# Patient Record
Sex: FEMALE | Race: BLACK OR AFRICAN AMERICAN | Employment: UNEMPLOYED | URBAN - METROPOLITAN AREA
[De-identification: names, ages, dates, MRNs, and addresses within clinical notes are randomized per-mention and may not be internally consistent; named-entity substitution may affect disease eponyms.]

---

## 2024-10-09 PROBLEM — O09.522 MULTIGRAVIDA OF ADVANCED MATERNAL AGE IN SECOND TRIMESTER: Status: ACTIVE | Noted: 2024-10-09

## 2024-10-09 PROBLEM — O09.892 HISTORY OF PRETERM DELIVERY, CURRENTLY PREGNANT IN SECOND TRIMESTER: Status: ACTIVE | Noted: 2024-10-09

## 2024-10-09 PROBLEM — O09.219 HISTORY OF PRECIPITOUS LABOR AND DELIVERIES, ANTEPARTUM: Status: ACTIVE | Noted: 2024-10-09

## 2024-10-18 ENCOUNTER — ROUTINE PRENATAL (OUTPATIENT)
Dept: OBGYN CLINIC | Age: 39
End: 2024-10-18
Payer: MEDICAID

## 2024-10-18 ENCOUNTER — ANCILLARY PROCEDURE (OUTPATIENT)
Dept: OBGYN CLINIC | Age: 39
End: 2024-10-18
Payer: MEDICAID

## 2024-10-18 VITALS
DIASTOLIC BLOOD PRESSURE: 69 MMHG | WEIGHT: 245 LBS | HEART RATE: 98 BPM | BODY MASS INDEX: 38.37 KG/M2 | SYSTOLIC BLOOD PRESSURE: 106 MMHG

## 2024-10-18 DIAGNOSIS — R80.9 URINE PROTEIN INCREASED: ICD-10-CM

## 2024-10-18 DIAGNOSIS — O09.219 HISTORY OF PRECIPITOUS LABOR AND DELIVERIES, ANTEPARTUM: ICD-10-CM

## 2024-10-18 DIAGNOSIS — O21.9 NAUSEA AND VOMITING OF PREGNANCY, ANTEPARTUM: ICD-10-CM

## 2024-10-18 DIAGNOSIS — O09.522 MULTIGRAVIDA OF ADVANCED MATERNAL AGE IN SECOND TRIMESTER: Primary | ICD-10-CM

## 2024-10-18 DIAGNOSIS — Z87.891 RECENTLY QUIT USING TOBACCO: ICD-10-CM

## 2024-10-18 DIAGNOSIS — O09.892 HISTORY OF PRETERM DELIVERY, CURRENTLY PREGNANT IN SECOND TRIMESTER: ICD-10-CM

## 2024-10-18 DIAGNOSIS — O41.8X20 SUBCHORIONIC HEMATOMA IN SECOND TRIMESTER, SINGLE OR UNSPECIFIED FETUS: ICD-10-CM

## 2024-10-18 DIAGNOSIS — O43.192 MARGINAL INSERTION OF UMBILICAL CORD AFFECTING MANAGEMENT OF MOTHER IN SECOND TRIMESTER: ICD-10-CM

## 2024-10-18 DIAGNOSIS — O46.8X2 SUBCHORIONIC HEMATOMA IN SECOND TRIMESTER, SINGLE OR UNSPECIFIED FETUS: ICD-10-CM

## 2024-10-18 DIAGNOSIS — O44.42 LOW LYING PLACENTA NOS OR WITHOUT HEMORRHAGE, SECOND TRIMESTER: ICD-10-CM

## 2024-10-18 LAB
GLUCOSE URINE, POC: NORMAL MG/DL
PROTEIN UA: NEGATIVE

## 2024-10-18 PROCEDURE — 81002 URINALYSIS NONAUTO W/O SCOPE: CPT | Performed by: OBSTETRICS & GYNECOLOGY

## 2024-10-18 PROCEDURE — 99213 OFFICE O/P EST LOW 20 MIN: CPT | Performed by: OBSTETRICS & GYNECOLOGY

## 2024-10-18 PROCEDURE — 76817 TRANSVAGINAL US OBSTETRIC: CPT | Performed by: OBSTETRICS & GYNECOLOGY

## 2024-10-18 PROCEDURE — 76811 OB US DETAILED SNGL FETUS: CPT | Performed by: OBSTETRICS & GYNECOLOGY

## 2024-10-18 PROCEDURE — 99214 OFFICE O/P EST MOD 30 MIN: CPT | Performed by: OBSTETRICS & GYNECOLOGY

## 2024-10-18 NOTE — PROGRESS NOTES
Patient is here today for f/u. Denies any vaginal bleeding, or leakage of fluids.  Lower abdominal cramping.   Patient reports good fetal movement.

## 2024-10-18 NOTE — PROGRESS NOTES
2024      Daniel Brownlee MD  [address]  [address]  [place] [address]     RE:  SUHAS ELLISON  : 1985   AGE: 38 y.o.    This report has been created using voice recognition software. It may contain errors which are inherent in voice recognition technology.    Dear Dr. Brownlee:      I had the pleasure of meeting with Ms. ELLISON for a return consultation.  As you know, Ms. ELLISON  is a 38 y.o.  at 19w5d (WK US) who is being followed by our office for multiple medical issues.      Today, Ms. ELLISON reports that she feels well.  She notes good fetal movement and denies any symptoms of leaking of fluid, vaginal bleeding, and/or contractions.  She had a fetal ultrasound that was notable for the following.  There is a single intrauterine gestation in a breech presentation with a heart rate of 159 beats per minute.  The placenta is posterior, low-lying.  The amniotic fluid index is normal.  The composite gestational age is 19w6d.  Transvaginal cervical length 6.2 cm without funneling.      PERTINENT PHYSICAL EXAMINATION:   /69   Pulse 98   Wt 111.1 kg (245 lb)   LMP 06/15/2024 (Approximate)   BMI 38.37 kg/m²     Urine dipstick:   Negative for Glucose    Negative for Albumin      A fetal ultrasound assessment was performed today. A report is enclosed for your review.    Assessment & Plan:  38 y.o.  at 19w5d (11 WK US) with:    1.    Advanced maternal age  -- The patient was previously counseled regarding the implications of advanced maternal age in pregnancy, specifically that of aneuploidy.  Counseling was reviewed.                   .      Her options for genetic screening with cfDNA, first trimester screen, and/or quad screen were discussed.  Additionally, diagnostic testing with chronic villous sampling and/or amniocentesis was reviewed.  The risks and benefits were reviewed.  The patient indicated that she would like to have screening with NIPT.  Testing was completed

## 2024-11-04 PROBLEM — Z87.891 RECENTLY QUIT USING TOBACCO: Status: ACTIVE | Noted: 2024-11-04

## 2024-11-04 PROBLEM — O44.42 LOW LYING PLACENTA NOS OR WITHOUT HEMORRHAGE, SECOND TRIMESTER: Status: ACTIVE | Noted: 2024-11-04

## 2024-11-04 PROBLEM — O46.8X2 SUBCHORIONIC HEMATOMA IN SECOND TRIMESTER: Status: ACTIVE | Noted: 2024-11-04

## 2024-11-04 PROBLEM — R80.9 URINE PROTEIN INCREASED: Status: ACTIVE | Noted: 2024-11-04

## 2024-11-04 PROBLEM — O41.8X20 SUBCHORIONIC HEMATOMA IN SECOND TRIMESTER: Status: ACTIVE | Noted: 2024-11-04

## 2024-11-04 PROBLEM — O43.192 MARGINAL INSERTION OF UMBILICAL CORD AFFECTING MANAGEMENT OF MOTHER IN SECOND TRIMESTER: Status: ACTIVE | Noted: 2024-11-04

## 2024-11-04 PROBLEM — O21.9 NAUSEA AND VOMITING OF PREGNANCY, ANTEPARTUM: Status: ACTIVE | Noted: 2024-11-04

## 2024-11-07 ENCOUNTER — ROUTINE PRENATAL (OUTPATIENT)
Dept: OBGYN CLINIC | Age: 39
End: 2024-11-07
Payer: MEDICAID

## 2024-11-07 ENCOUNTER — ANCILLARY PROCEDURE (OUTPATIENT)
Dept: OBGYN CLINIC | Age: 39
End: 2024-11-07
Payer: MEDICAID

## 2024-11-07 VITALS
BODY MASS INDEX: 39.09 KG/M2 | SYSTOLIC BLOOD PRESSURE: 108 MMHG | WEIGHT: 249.6 LBS | DIASTOLIC BLOOD PRESSURE: 68 MMHG | HEART RATE: 98 BPM

## 2024-11-07 DIAGNOSIS — O09.219 HISTORY OF PRECIPITOUS LABOR AND DELIVERIES, ANTEPARTUM: ICD-10-CM

## 2024-11-07 DIAGNOSIS — R79.89 LOW VITAMIN D LEVEL: ICD-10-CM

## 2024-11-07 DIAGNOSIS — O09.892 HISTORY OF PRETERM DELIVERY, CURRENTLY PREGNANT IN SECOND TRIMESTER: ICD-10-CM

## 2024-11-07 DIAGNOSIS — O09.522 MULTIGRAVIDA OF ADVANCED MATERNAL AGE IN SECOND TRIMESTER: Primary | ICD-10-CM

## 2024-11-07 LAB
GLUCOSE URINE, POC: NEGATIVE MG/DL
PROTEIN UA: NEGATIVE

## 2024-11-07 PROCEDURE — 99213 OFFICE O/P EST LOW 20 MIN: CPT | Performed by: OBSTETRICS & GYNECOLOGY

## 2024-11-07 PROCEDURE — 81002 URINALYSIS NONAUTO W/O SCOPE: CPT | Performed by: OBSTETRICS & GYNECOLOGY

## 2024-11-07 PROCEDURE — 76805 OB US >/= 14 WKS SNGL FETUS: CPT | Performed by: OBSTETRICS & GYNECOLOGY

## 2024-11-07 PROCEDURE — 76817 TRANSVAGINAL US OBSTETRIC: CPT | Performed by: OBSTETRICS & GYNECOLOGY

## 2024-11-07 RX ORDER — ACETAMINOPHEN 160 MG
2000 TABLET,DISINTEGRATING ORAL DAILY
Qty: 30 CAPSULE | Refills: 3 | Status: SHIPPED | OUTPATIENT
Start: 2024-11-07

## 2024-11-07 NOTE — PATIENT INSTRUCTIONS
Please arrive for your scheduled appointment at least 15 minutes early with your actual insurance card+ a photo ID. Also if you need any refills ordered or have questions, it may take up 48 hours to reply. Please allow ample time for your refills. Call me when you use last refill. Thank you for your cooperation. You might be having an NST at your next appt. Please eat a large snack or breakfast before coming to office. Thank youCall your primary obstetrician with bleeding, leaking of fluid, abdominal tenderness, headache, blurry vision, epigastric pain and increased urinary frequency.If you are experiencing an emergency and need immediate help, call 911 or go to go emergency room or labor and delivery. Do kick counts after dinner. Call your primary obstetrician if less than 10 kicks in 2 hours after dinner.     Call your primary obstetrician with bleeding, leaking of fluid, abdominal tenderness, headache, blurry vision, epigastric pain and increased urinary frequency.if you are sick, not feeling well or have an infectious process going on please reschedule your appointment by calling [phone]. Also if any family members are not feeling well, please do not bring them to your appointment. We appreciate your cooperation. We are doing this in order to protect our pregnant mothers+ their babies.if you are sick, not feeling well or have an infectious process going on please reschedule your appointment by calling [phone]. Also if any family members are not feeling well, please do not bring them to your appointment. We appreciate your cooperation. We are doing this in order to protect our pregnant mothers+ their babies.

## 2024-11-07 NOTE — PROGRESS NOTES
Pt alert and pleasant, here for 3 wk follow up. Pt denies bleeding, cramping, and lof. Pt states positive fetal movement. Urine was negative for glucose and protein.

## 2024-11-07 NOTE — PROGRESS NOTES
2024      Daniel Brownlee MD  [address]  [address]  [place] [address]     RE:  SUHAS ELLISON  : 1985   AGE: 39 y.o.    This report has been created using voice recognition software. It may contain errors which are inherent in voice recognition technology.    Dear Dr. Brownlee:      I had the pleasure of meeting with Ms. ELLISON for a return consultation.  As you know, Ms. ELLISON  is a 39 y.o.  at 22w4d (11 WK US) who is being followed by our office for multiple medical issues.      Today, Ms. ELLISON reports that she feels well.  She notes good fetal movement and denies any symptoms of leaking of fluid, vaginal bleeding, and/or contractions.  She had a fetal ultrasound that was notable for the following.  There is a single intrauterine gestation in a breech presentation with a heart rate of 159 beats per minute.  The placenta is posterior.  The amniotic fluid index is normal.  The composite gestational age is 22w4d.  The estimated fetal weight is at the 52nd percentile.   Transvaginal cervical length 4.5 cm without funneling.      PERTINENT PHYSICAL EXAMINATION:   /68   Pulse 98   Wt 113.2 kg (249 lb 9.6 oz)   LMP 06/15/2024 (Approximate)   BMI 39.09 kg/m²     Urine dipstick:   Negative for Glucose    Negative for Albumin      A fetal ultrasound assessment was performed today. A report is enclosed for your review.    Assessment & Plan:  39 y.o.  at 22w4d (11 WK US) with:    1.   Advanced maternal age  -- The patient was previously counseled regarding the implications of advanced maternal age in pregnancy, specifically that of aneuploidy.  Counseling was reviewed.                   .      The patient completed screening with NIPT.  Testing was completed on 2024.  The fetal fraction was 11%.  Results were low risk.     The patient was also counseled regarding the recommendations for carrier screening for cystic fibrosis, spinal muscular atrophy, and Fragile X.

## 2024-11-21 ENCOUNTER — TELEPHONE (OUTPATIENT)
Dept: OBGYN CLINIC | Age: 39
End: 2024-11-21

## 2024-11-21 NOTE — TELEPHONE ENCOUNTER
Patient called in and stated that she believes she is losing her mucus plug. Patient stated she noticed a clear mucus like discharge Monday and some yesterday. She stated her cramping is normal nothing changed since beginning of pregnancy and baby is moving and denies vaginal bleeding. Patient said she left a message with her regular OB as well. I informed patient if her cramping worsens, does not feel movement, starts bleeding or discharge increases to report to L&D.

## 2024-11-24 PROBLEM — R79.89 LOW VITAMIN D LEVEL: Status: ACTIVE | Noted: 2024-11-24

## 2024-12-02 ENCOUNTER — ANCILLARY PROCEDURE (OUTPATIENT)
Dept: OBGYN CLINIC | Age: 39
End: 2024-12-02
Payer: MEDICAID

## 2024-12-02 ENCOUNTER — ROUTINE PRENATAL (OUTPATIENT)
Dept: OBGYN CLINIC | Age: 39
End: 2024-12-02
Payer: MEDICAID

## 2024-12-02 VITALS
DIASTOLIC BLOOD PRESSURE: 63 MMHG | HEART RATE: 111 BPM | WEIGHT: 255.2 LBS | SYSTOLIC BLOOD PRESSURE: 94 MMHG | BODY MASS INDEX: 39.97 KG/M2

## 2024-12-02 DIAGNOSIS — O09.892 HISTORY OF PRETERM DELIVERY, CURRENTLY PREGNANT IN SECOND TRIMESTER: ICD-10-CM

## 2024-12-02 DIAGNOSIS — R79.89 LOW VITAMIN D LEVEL: ICD-10-CM

## 2024-12-02 DIAGNOSIS — O09.219 HISTORY OF PRECIPITOUS LABOR AND DELIVERIES, ANTEPARTUM: ICD-10-CM

## 2024-12-02 DIAGNOSIS — O09.522 MULTIGRAVIDA OF ADVANCED MATERNAL AGE IN SECOND TRIMESTER: Primary | ICD-10-CM

## 2024-12-02 PROCEDURE — 99213 OFFICE O/P EST LOW 20 MIN: CPT | Performed by: OBSTETRICS & GYNECOLOGY

## 2024-12-02 PROCEDURE — 76817 TRANSVAGINAL US OBSTETRIC: CPT | Performed by: OBSTETRICS & GYNECOLOGY

## 2024-12-02 PROCEDURE — 76816 OB US FOLLOW-UP PER FETUS: CPT | Performed by: OBSTETRICS & GYNECOLOGY

## 2024-12-02 PROCEDURE — 81002 URINALYSIS NONAUTO W/O SCOPE: CPT | Performed by: OBSTETRICS & GYNECOLOGY

## 2024-12-02 NOTE — PROGRESS NOTES
Pt presents for follow up. Pt is having some discharge, thinks it may be her mucus plug coming out. Pt denies bleeding and lof. Pt is having some mild cramping. Positive fetal movement.  
gestation.  --Transvaginal cervical length 19 weeks 5 days, 6.2 cm  --Transvaginal cervical length 22 weeks 4 days, 4.5 cm  --Transvaginal cervical length 26 weeks 1 day, 4.9 cm     5.  History of renal stones -- The patient reported a history of kidney stones for which she has had surgical treatment in the past.  Secondary to this history, she is at increased risk for recurrent kidney stones.       The patient again denied having any symptoms today.  Precautions were again reviewed.     6.  Proteinuria -- The patient was noted to have trace protein on a urine dip today. She was normotensive with a blood pressure of 104/70 and she denied any signs or symptoms of preeclampsia. She denied any signs or symptoms of a urinary tract infection.  She was given orders to have a urine culture and urine P/C ratio. If any abnormalities are detected, she will be contacted with results and follow-up recommendations.  Testing ordered on 8/20/2024.     The patient's urine was negative for protein at 19 weeks 5 days.  Her blood pressure was 106/69.     The patient's urine was negative for protein at 22 weeks 4 days.  Her blood pressure was normal 108/68.    The patient's urine was negative for protein at 26 weeks 1 day.  Blood pressure was 94/63.     Preeclampsia precautions were reviewed with the patient.     7. Obesity in pregnancy -- The patient reports that she is 5 foot 7 inches tall. She  reports that her weight has been stable.  She weighed 255 pounds today.  She has gained 6 pounds since her last visit.  Her BMI was 39.97.     Counseling was previously provided to the patient.  Counseling was reviewed.     Given the increased risks previously described, additional testing is recommended.  Fetal growth should be monitored every 3-4 weeks starting at 24-26 weeks' gestation.   --Fetal growth was appropriate for the gestational age at 19 weeks 5 days  --Fetal growth was appropriate for the gestational age at 22 weeks 4

## 2024-12-02 NOTE — PATIENT INSTRUCTIONS
Please arrive for your scheduled appointment at least 15 minutes early with your actual insurance card+ a photo ID. Also if you need any refills ordered or have questions, it may take up 48 hours to reply. Please allow ample time for your refills. Call me when you use last refill. Thank you for your cooperation. You might be having an NST at your next appt. Please eat a large snack or breakfast before coming to office. Thank youCall your primary obstetrician with bleeding, leaking of fluid, abdominal tenderness, headache, blurry vision, epigastric pain and increased urinary frequency.If you are experiencing an emergency and need immediate help, call 911 or go to go emergency room or labor and delivery. Do kick counts after dinner. Call your primary obstetrician if less than 10 kicks in 2 hours after dinner.     Call your primary obstetrician with bleeding, leaking of fluid, abdominal tenderness, headache, blurry vision, epigastric pain and increased urinary frequency.if you are sick, not feeling well or have an infectious process going on please reschedule your appointment by calling [phone]. Also if any family members are not feeling well, please do not bring them to your appointment. We appreciate your cooperation. We are doing this in order to protect our pregnant mothers+ their babies.if you are sick, not feeling well or have an infectious process going on please reschedule your appointment by calling [phone]. Also if any family members are not feeling well, please do not bring them to your appointment. We appreciate your cooperation. We are doing this in order to protect our pregnant mothers+ their babies.

## 2024-12-04 LAB
GLUCOSE URINE, POC: NORMAL MG/DL
PROTEIN UA: NEGATIVE

## 2024-12-19 ENCOUNTER — ANCILLARY PROCEDURE (OUTPATIENT)
Dept: OBGYN CLINIC | Age: 39
End: 2024-12-19
Payer: MEDICAID

## 2024-12-19 ENCOUNTER — ROUTINE PRENATAL (OUTPATIENT)
Dept: OBGYN CLINIC | Age: 39
End: 2024-12-19
Payer: MEDICAID

## 2024-12-19 VITALS
DIASTOLIC BLOOD PRESSURE: 72 MMHG | OXYGEN SATURATION: 93 % | HEIGHT: 67 IN | WEIGHT: 257.3 LBS | HEART RATE: 106 BPM | BODY MASS INDEX: 40.38 KG/M2 | SYSTOLIC BLOOD PRESSURE: 106 MMHG

## 2024-12-19 DIAGNOSIS — R79.89 LOW VITAMIN D LEVEL: ICD-10-CM

## 2024-12-19 DIAGNOSIS — O09.892 HISTORY OF PRETERM DELIVERY, CURRENTLY PREGNANT IN SECOND TRIMESTER: ICD-10-CM

## 2024-12-19 DIAGNOSIS — O09.522 MULTIGRAVIDA OF ADVANCED MATERNAL AGE IN SECOND TRIMESTER: Primary | ICD-10-CM

## 2024-12-19 DIAGNOSIS — O09.219 HISTORY OF PRECIPITOUS LABOR AND DELIVERIES, ANTEPARTUM: ICD-10-CM

## 2024-12-19 LAB
GLUCOSE URINE, POC: NEGATIVE MG/DL
PROTEIN UA: POSITIVE

## 2024-12-19 PROCEDURE — 76821 MIDDLE CEREBRAL ARTERY ECHO: CPT | Performed by: OBSTETRICS & GYNECOLOGY

## 2024-12-19 PROCEDURE — 76820 UMBILICAL ARTERY ECHO: CPT | Performed by: OBSTETRICS & GYNECOLOGY

## 2024-12-19 PROCEDURE — 76817 TRANSVAGINAL US OBSTETRIC: CPT | Performed by: OBSTETRICS & GYNECOLOGY

## 2024-12-19 PROCEDURE — 99213 OFFICE O/P EST LOW 20 MIN: CPT | Performed by: OBSTETRICS & GYNECOLOGY

## 2024-12-19 PROCEDURE — 81002 URINALYSIS NONAUTO W/O SCOPE: CPT | Performed by: OBSTETRICS & GYNECOLOGY

## 2024-12-19 PROCEDURE — 76819 FETAL BIOPHYS PROFIL W/O NST: CPT | Performed by: OBSTETRICS & GYNECOLOGY

## 2024-12-19 PROCEDURE — H1000 PRENATAL CARE ATRISK ASSESSM: HCPCS | Performed by: OBSTETRICS & GYNECOLOGY

## 2024-12-19 PROCEDURE — 76815 OB US LIMITED FETUS(S): CPT | Performed by: OBSTETRICS & GYNECOLOGY

## 2024-12-19 NOTE — PATIENT INSTRUCTIONS
Please arrive for your scheduled appointment at least 15 minutes early with your actual insurance card+ a photo ID. Also if you need any refills ordered or have questions, it may take up 48 hours to reply. Please allow ample time for your refills. Call me when you use last refill. Thank you for your cooperation. You might be having an NST at your next appt. Please eat a large snack or breakfast before coming to office. Thank youCall your primary obstetrician with bleeding, leaking of fluid, abdominal tenderness, headache, blurry vision, epigastric pain and increased urinary frequency.If you are experiencing an emergency and need immediate help, call 911 or go to go emergency room or labor and delivery. Do kick counts after dinner. Call your primary obstetrician if less than 10 kicks in 2 hours after dinner.     Call your primary obstetrician with bleeding, leaking of fluid, abdominal tenderness, headache, blurry vision, epigastric pain and increased urinary frequency.if you are sick, not feeling well or have an infectious process going on please reschedule your appointment by calling [phone]. Also if any family members are not feeling well, please do not bring them to your appointment. We appreciate your cooperation. We are doing this in order to protect our pregnant mothers+ their babies.if you are sick, not feeling well or have an infectious process going on please reschedule your appointment by calling [phone]. Also if any family members are not feeling well, please do not bring them to your appointment. We appreciate your cooperation. We are doing this in order to protect our pregnant mothers+ their babies.

## 2024-12-26 NOTE — PROGRESS NOTES
PRAF completed for 3rd trimester.   
Pt presents for 6-9 week follow up. Pt denies bleeding, cramping, and lof. Pt states good fetal movement.  
recommended.  Fetal growth should be monitored every 3-4 weeks starting at 24-26 weeks' gestation.   --Fetal growth was appropriate for the gestational age at 19 weeks 5 days  --Fetal growth was appropriate for the gestational age at 22 weeks 4 days.  --Fetal growth was appropriate for the gestational age at 26 weeks 1 day     The patient should monitor fetal kick counts daily, starting at 28 weeks' gestation.  She should be scheduled for increased fetal surveillance with twice weekly fetal testing after 32 weeks' gestation.  In the absence of any complications, delivery is recommended at/by 39 weeks' gestation.     Given the increased risk for hypertensive disorders of pregnancy,  the potential utility of a low dose aspirin in reducing her risk for hypertensive disorders of pregnancy was reviewed.  The risks and benefits of low dose aspirin were discussed.  She was counseled that she could take 81 mg of aspirin, daily.  A prescription was previously provided.  The patient should continue a daily low-dose aspirin for the remainder of pregnancy and 6 to 8 weeks postpartum.     Additional maternal evaluation was recommended with a nutrition panel, thyroid function studies, and a hemoglobin A1c.  -- Baseline testing previously ordered on 8/20/2024.  Testing was completed on 10/31/2024, results included: H/H12.1/36, MCV 92.8, platelet count 240,000, magnesium 1.8, potassium 3.9, creatinine 0.6, calcium 8.7, ALT 13, AST 15, hemoglobin A1c 5%, uric acid 5.3, , ferritin 35, folate >24, vitamin B12 435, vitamin D 23, hemoglobin A1c 5%, TSH 1.2, free T4 0.8, TPO negative, antithyroglobulin antibody negative, urine protein creatinine ratio 0.099, urinalysis positive for bacteria, urine culture contaminated.  --Additional recommendations are below.        8. Family history of trisomy 21 -- The patient's family history was previously reviewed.  She reports that her maternal uncle has Down syndrome.  She denied any other

## 2025-01-08 ENCOUNTER — ANCILLARY PROCEDURE (OUTPATIENT)
Dept: OBGYN CLINIC | Age: 40
End: 2025-01-08
Payer: MEDICAID

## 2025-01-08 ENCOUNTER — ROUTINE PRENATAL (OUTPATIENT)
Dept: OBGYN CLINIC | Age: 40
End: 2025-01-08
Payer: MEDICAID

## 2025-01-08 VITALS
WEIGHT: 256 LBS | HEART RATE: 96 BPM | SYSTOLIC BLOOD PRESSURE: 106 MMHG | BODY MASS INDEX: 40.1 KG/M2 | DIASTOLIC BLOOD PRESSURE: 72 MMHG

## 2025-01-08 DIAGNOSIS — O09.892 HISTORY OF PRETERM DELIVERY, CURRENTLY PREGNANT IN SECOND TRIMESTER: Primary | ICD-10-CM

## 2025-01-08 DIAGNOSIS — O09.522 MULTIGRAVIDA OF ADVANCED MATERNAL AGE IN SECOND TRIMESTER: ICD-10-CM

## 2025-01-08 DIAGNOSIS — O09.219 HISTORY OF PRECIPITOUS LABOR AND DELIVERIES, ANTEPARTUM: ICD-10-CM

## 2025-01-08 PROCEDURE — 76818 FETAL BIOPHYS PROFILE W/NST: CPT | Performed by: OBSTETRICS & GYNECOLOGY

## 2025-01-08 PROCEDURE — 76820 UMBILICAL ARTERY ECHO: CPT | Performed by: OBSTETRICS & GYNECOLOGY

## 2025-01-08 PROCEDURE — 76821 MIDDLE CEREBRAL ARTERY ECHO: CPT | Performed by: OBSTETRICS & GYNECOLOGY

## 2025-01-08 PROCEDURE — 76817 TRANSVAGINAL US OBSTETRIC: CPT | Performed by: OBSTETRICS & GYNECOLOGY

## 2025-01-08 PROCEDURE — 76805 OB US >/= 14 WKS SNGL FETUS: CPT | Performed by: OBSTETRICS & GYNECOLOGY

## 2025-01-08 PROCEDURE — 99213 OFFICE O/P EST LOW 20 MIN: CPT | Performed by: OBSTETRICS & GYNECOLOGY

## 2025-01-08 NOTE — PROGRESS NOTES
2025      Daniel Brownlee MD  [address]  [address]  [place] [address]     RE:  USHAS ELLISON  : 1985   AGE: 39 y.o.    This report has been created using voice recognition software. It may contain errors which are inherent in voice recognition technology.    Dear Dr. Brownlee:      I had the pleasure of meeting with Ms. ELLISON for a return consultation.  As you know, Ms. ELLISON  is a 39 y.o.  at 31w3d (11 WK US) who is being followed by our office for multiple medical issues. The patient left the office following her ultrasound. The consultation was completed via telephone.     Today, Ms. ELLISON reports that she feels well.  She notes good fetal movement and denies any symptoms of leaking of fluid, vaginal bleeding, and/or contractions.  She had a fetal ultrasound that was notable for the following.  There is a single intrauterine gestation in a cephalic presentation with a heart rate of 45 beats per minute.  The placenta is posterior.  The amniotic fluid index is 12 cm.  The composite gestational age is 30w2d.  The estimated fetal weight is at the 45th percentile.  Head circumference 5th percentile.  BPP 10/10.  Doppler studies normal.  CPR PI normal.  Transvaginal cervical length 4.5 cm without funneling.    Shoulder JJ    PERTINENT PHYSICAL EXAMINATION:   /72   Pulse 96   Wt 116.1 kg (256 lb)   LMP 06/15/2024 (Approximate)   BMI 40.10 kg/m²     Urine dipstick:   No urine specimen      A fetal ultrasound assessment was performed today. A report is enclosed for your review.    Assessment & Plan:  39 y.o.  at 31w3d (11 wk US) with:    1.   Advanced maternal age  -- The patient was previously counseled regarding the implications of advanced maternal age in pregnancy, specifically that of aneuploidy.  Counseling was reviewed.                   .      The patient completed screening with NIPT.  Testing was completed on 2024.  The fetal fraction was 11%.  Results

## 2025-01-20 ENCOUNTER — ANCILLARY PROCEDURE (OUTPATIENT)
Dept: OBGYN CLINIC | Age: 40
End: 2025-01-20
Payer: MEDICAID

## 2025-01-20 ENCOUNTER — ROUTINE PRENATAL (OUTPATIENT)
Dept: OBGYN CLINIC | Age: 40
End: 2025-01-20
Payer: MEDICAID

## 2025-01-20 VITALS
HEART RATE: 114 BPM | HEIGHT: 67 IN | DIASTOLIC BLOOD PRESSURE: 73 MMHG | SYSTOLIC BLOOD PRESSURE: 117 MMHG | BODY MASS INDEX: 40.82 KG/M2 | WEIGHT: 260.1 LBS | TEMPERATURE: 97.4 F | OXYGEN SATURATION: 98 %

## 2025-01-20 DIAGNOSIS — O09.892 HISTORY OF PRETERM DELIVERY, CURRENTLY PREGNANT IN SECOND TRIMESTER: Primary | ICD-10-CM

## 2025-01-20 DIAGNOSIS — O09.522 MULTIGRAVIDA OF ADVANCED MATERNAL AGE IN SECOND TRIMESTER: ICD-10-CM

## 2025-01-20 DIAGNOSIS — O09.219 HISTORY OF PRECIPITOUS LABOR AND DELIVERIES, ANTEPARTUM: ICD-10-CM

## 2025-01-20 LAB
GLUCOSE URINE, POC: NEGATIVE MG/DL
PROTEIN UA: POSITIVE

## 2025-01-20 PROCEDURE — 81002 URINALYSIS NONAUTO W/O SCOPE: CPT | Performed by: OBSTETRICS & GYNECOLOGY

## 2025-01-20 PROCEDURE — 76821 MIDDLE CEREBRAL ARTERY ECHO: CPT | Performed by: OBSTETRICS & GYNECOLOGY

## 2025-01-20 PROCEDURE — 76818 FETAL BIOPHYS PROFILE W/NST: CPT | Performed by: OBSTETRICS & GYNECOLOGY

## 2025-01-20 PROCEDURE — 99213 OFFICE O/P EST LOW 20 MIN: CPT | Performed by: OBSTETRICS & GYNECOLOGY

## 2025-01-20 PROCEDURE — 76815 OB US LIMITED FETUS(S): CPT | Performed by: OBSTETRICS & GYNECOLOGY

## 2025-01-20 PROCEDURE — 99999 PR OFFICE/OUTPT VISIT,PROCEDURE ONLY: CPT | Performed by: OBSTETRICS & GYNECOLOGY

## 2025-01-20 PROCEDURE — 76820 UMBILICAL ARTERY ECHO: CPT | Performed by: OBSTETRICS & GYNECOLOGY

## 2025-01-20 NOTE — PATIENT INSTRUCTIONS
Please arrive for your scheduled appointment at least 15 minutes early with your actual insurance card+ a photo ID. Also if you need any refills ordered or have questions, it may take up 48 hours to reply. Please allow ample time for your refills. Call me when you use last refill. Thank you for your cooperation. You might be having an NST at your next appt. Please eat a large snack or breakfast before coming to office. Thank youCall your primary obstetrician with bleeding, leaking of fluid, abdominal tenderness, headache, blurry vision, epigastric pain and increased urinary frequency.If you are experiencing an emergency and need immediate help, call 911 or go to go emergency room or labor and delivery. Do kick counts after dinner. Call your primary obstetrician if less than 10 kicks in 2 hours after dinner.     Call your primary obstetrician with bleeding, leaking of fluid, abdominal tenderness, headache, blurry vision, epigastric pain and increased urinary frequency.if you are sick, not feeling well or have an infectious process going on please reschedule your appointment by calling [phone]. Also if any family members are not feeling well, please do not bring them to your appointment. We appreciate your cooperation. We are doing this in order to protect our pregnant mothers+ their babies.if you are sick, not feeling well or have an infectious process going on please reschedule your appointment by calling [phone]. Also if any family members are not feeling well, please do not bring them to your appointment. We appreciate your cooperation. We are doing this in order to protect our pregnant mothers+ their babies.

## 2025-01-30 ENCOUNTER — ANCILLARY PROCEDURE (OUTPATIENT)
Dept: OBGYN CLINIC | Age: 40
End: 2025-01-30
Payer: MEDICAID

## 2025-01-30 ENCOUNTER — ROUTINE PRENATAL (OUTPATIENT)
Dept: OBGYN CLINIC | Age: 40
End: 2025-01-30
Payer: MEDICAID

## 2025-01-30 VITALS
OXYGEN SATURATION: 98 % | HEART RATE: 102 BPM | TEMPERATURE: 98.9 F | SYSTOLIC BLOOD PRESSURE: 135 MMHG | DIASTOLIC BLOOD PRESSURE: 83 MMHG | WEIGHT: 259 LBS | RESPIRATION RATE: 14 BRPM | BODY MASS INDEX: 40.57 KG/M2

## 2025-01-30 DIAGNOSIS — R79.89 LOW VITAMIN D LEVEL: ICD-10-CM

## 2025-01-30 DIAGNOSIS — R80.9 URINE PROTEIN INCREASED: ICD-10-CM

## 2025-01-30 DIAGNOSIS — O43.192 MARGINAL INSERTION OF UMBILICAL CORD AFFECTING MANAGEMENT OF MOTHER IN SECOND TRIMESTER: ICD-10-CM

## 2025-01-30 DIAGNOSIS — O09.892 HISTORY OF PRETERM DELIVERY, CURRENTLY PREGNANT IN SECOND TRIMESTER: ICD-10-CM

## 2025-01-30 DIAGNOSIS — O09.219 HISTORY OF PRECIPITOUS LABOR AND DELIVERIES, ANTEPARTUM: Primary | ICD-10-CM

## 2025-01-30 LAB
GLUCOSE URINE, POC: NORMAL MG/DL
PROTEIN UA: NEGATIVE

## 2025-01-30 PROCEDURE — 76821 MIDDLE CEREBRAL ARTERY ECHO: CPT | Performed by: OBSTETRICS & GYNECOLOGY

## 2025-01-30 PROCEDURE — 76816 OB US FOLLOW-UP PER FETUS: CPT | Performed by: OBSTETRICS & GYNECOLOGY

## 2025-01-30 PROCEDURE — 99213 OFFICE O/P EST LOW 20 MIN: CPT | Performed by: OBSTETRICS & GYNECOLOGY

## 2025-01-30 PROCEDURE — 76818 FETAL BIOPHYS PROFILE W/NST: CPT | Performed by: OBSTETRICS & GYNECOLOGY

## 2025-01-30 PROCEDURE — 76820 UMBILICAL ARTERY ECHO: CPT | Performed by: OBSTETRICS & GYNECOLOGY

## 2025-01-30 PROCEDURE — 81002 URINALYSIS NONAUTO W/O SCOPE: CPT | Performed by: OBSTETRICS & GYNECOLOGY

## 2025-01-30 NOTE — PROGRESS NOTES
Patient is here today for US/nst. Denies any vaginal bleeding, cramping, or leakage of fluids.  Patient reports good fetal movement.

## 2025-01-31 NOTE — PROGRESS NOTES
2025      Daniel Brownlee MD  [address]  [address]  [place] [address]     RE:  SUHAS ELLISON  : 1985   AGE: 39 y.o.    This report has been created using voice recognition software. It may contain errors which are inherent in voice recognition technology.    Dear Dr. Brownlee:      I had the pleasure of meeting with Ms. ELLISON for a return consultation.  As you know, Ms. ELLISON  is a 39 y.o.  at 34w4d (11 WK US) who is being followed by our office for multiple medical issues. The patient left the office following her ultrasound. The consultation was completed via telephone.  Verbal consent was obtained for the patient to complete the consultation via telephone.      Today, Ms. ELLISON reports that she feels well.  She notes good fetal movement and denies any symptoms of leaking of fluid, vaginal bleeding, and/or contractions.  She had a fetal ultrasound that was notable for the following.  There is a single intrauterine gestation in a cephalic presentation with a heart rate of 157 beats per minute.  The placenta is posterior.  The amniotic fluid index is 14.6 cm.  The composite gestational age is 34w0d.  The estimated fetal weight is at the 51st percentile.   BPP 10/10.  Doppler studies normal.  CPR PI normal.      PERTINENT PHYSICAL EXAMINATION:   /83   Pulse (!) 102   Temp 98.9 °F (37.2 °C) (Temporal)   Resp 14   Wt 117.5 kg (259 lb)   LMP 06/15/2024 (Approximate)   SpO2 98%   BMI 40.57 kg/m²     Urine dipstick:   Negative for Glucose    1+ for Albumin      A fetal ultrasound assessment was performed today. A report is enclosed for your review.    Assessment & Plan:  39 y.o.  at 34w4d (11 WK US) with:    1.  Advanced maternal age  -- The patient was previously counseled regarding the implications of advanced maternal age in pregnancy, specifically that of aneuploidy.  Counseling was reviewed.                   .      The patient completed screening with NIPT.

## 2025-02-03 ENCOUNTER — ANCILLARY PROCEDURE (OUTPATIENT)
Dept: OBGYN CLINIC | Age: 40
End: 2025-02-03
Payer: MEDICAID

## 2025-02-03 ENCOUNTER — ROUTINE PRENATAL (OUTPATIENT)
Dept: OBGYN CLINIC | Age: 40
End: 2025-02-03
Payer: MEDICAID

## 2025-02-03 VITALS
RESPIRATION RATE: 12 BRPM | TEMPERATURE: 97.2 F | SYSTOLIC BLOOD PRESSURE: 110 MMHG | HEART RATE: 120 BPM | DIASTOLIC BLOOD PRESSURE: 60 MMHG | OXYGEN SATURATION: 98 % | BODY MASS INDEX: 40.38 KG/M2 | WEIGHT: 257.8 LBS

## 2025-02-03 DIAGNOSIS — O44.42 LOW LYING PLACENTA NOS OR WITHOUT HEMORRHAGE, SECOND TRIMESTER: ICD-10-CM

## 2025-02-03 DIAGNOSIS — O36.8130 DECREASED FETAL MOVEMENTS IN THIRD TRIMESTER, SINGLE OR UNSPECIFIED FETUS: ICD-10-CM

## 2025-02-03 DIAGNOSIS — O09.892 HISTORY OF PRETERM DELIVERY, CURRENTLY PREGNANT IN SECOND TRIMESTER: ICD-10-CM

## 2025-02-03 DIAGNOSIS — O09.522 MULTIGRAVIDA OF ADVANCED MATERNAL AGE IN SECOND TRIMESTER: ICD-10-CM

## 2025-02-03 DIAGNOSIS — O43.192 MARGINAL INSERTION OF UMBILICAL CORD AFFECTING MANAGEMENT OF MOTHER IN SECOND TRIMESTER: ICD-10-CM

## 2025-02-03 DIAGNOSIS — O09.219 HISTORY OF PRECIPITOUS LABOR AND DELIVERIES, ANTEPARTUM: Primary | ICD-10-CM

## 2025-02-03 LAB
GLUCOSE URINE, POC: NEGATIVE MG/DL
PROTEIN UA: POSITIVE

## 2025-02-03 PROCEDURE — 76815 OB US LIMITED FETUS(S): CPT | Performed by: OBSTETRICS & GYNECOLOGY

## 2025-02-03 PROCEDURE — 76817 TRANSVAGINAL US OBSTETRIC: CPT | Performed by: OBSTETRICS & GYNECOLOGY

## 2025-02-03 PROCEDURE — 81002 URINALYSIS NONAUTO W/O SCOPE: CPT | Performed by: OBSTETRICS & GYNECOLOGY

## 2025-02-03 PROCEDURE — 76821 MIDDLE CEREBRAL ARTERY ECHO: CPT | Performed by: OBSTETRICS & GYNECOLOGY

## 2025-02-03 PROCEDURE — 99213 OFFICE O/P EST LOW 20 MIN: CPT | Performed by: OBSTETRICS & GYNECOLOGY

## 2025-02-03 PROCEDURE — 76820 UMBILICAL ARTERY ECHO: CPT | Performed by: OBSTETRICS & GYNECOLOGY

## 2025-02-03 PROCEDURE — 59025 FETAL NON-STRESS TEST: CPT | Performed by: OBSTETRICS & GYNECOLOGY

## 2025-02-03 PROCEDURE — 76818 FETAL BIOPHYS PROFILE W/NST: CPT | Performed by: OBSTETRICS & GYNECOLOGY

## 2025-02-03 NOTE — PROGRESS NOTES
Raghu ELLISON presents to office today for NST.  Patient denies bleeding, LOF, or contractions. C/O pelvic pressure  Positive fetal movement, but feels that is decreased.

## 2025-02-03 NOTE — PROGRESS NOTES
Mari in Antepartum notified of patient coming for prolonged monitoring d/t c/o decreased fetal movement.

## 2025-02-04 NOTE — PROGRESS NOTES
February 3, 2025      Daniel Brownlee MD  [address]  [address]  [place] [address]     RE:  SUHAS ELLISON  : 1985   AGE: 39 y.o.    This report has been created using voice recognition software. It may contain errors which are inherent in voice recognition technology.    Dear Dr. Brownlee:      As you know, Ms. ELLISON  is a 39 y.o.  at 35w1d (11 WK US ) who is being followed by our office for advanced maternal age, a history of 3 prior precipitous deliveries/home deliveries, BMI 40, a marginal umbilical cord insertion, low vitamin D, and a lagging head circumference.    The patient presented to the office today for fetal testing.  She reported having decreased fetal movement per nursing.      The patient had a fetal ultrasound that was notable for the following.  There is a single intrauterine gestation in a cephalic presentation with a heart rate of 150 beats per minute.  The placenta is posterior.  The amniotic fluid index is 16 cm.  BPP 10/10.  Umbilical artery PI normal.  MCA Dopplers normal.  MCA PSV 1.11 MOM.  CPR PI normal.  Transvaginal cervical length 3.5 cm without funneling.      PERTINENT PHYSICAL EXAMINATION:   /60   Pulse (!) 120   Temp 97.2 °F (36.2 °C)   Resp 12   Wt 116.9 kg (257 lb 12.8 oz)   LMP 06/15/2024 (Approximate)   SpO2 98%   BMI 40.38 kg/m²     Urine dipstick:   Negative for Glucose    Trace for Albumin      A fetal ultrasound assessment was performed today. A report is enclosed for your review.    Assessment & Plan:  39 y.o.  at 35w1d (11 WK US) with:    1.  Fetal testing -- The patient presented to the office today for fetal testing secondary to advanced maternal age, a history of 3 prior precipitous deliveries/home deliveries, BMI 40, a marginal umbilical cord insertion, low vitamin D, and a lagging head circumference.      The patient presented to the office for fetal testing.  She reported decreased fetal movement.  Fetal testing was reassuring

## 2025-02-06 ENCOUNTER — ROUTINE PRENATAL (OUTPATIENT)
Dept: OBGYN CLINIC | Age: 40
End: 2025-02-06
Payer: MEDICAID

## 2025-02-06 ENCOUNTER — ANCILLARY PROCEDURE (OUTPATIENT)
Dept: OBGYN CLINIC | Age: 40
End: 2025-02-06
Payer: MEDICAID

## 2025-02-06 VITALS
DIASTOLIC BLOOD PRESSURE: 61 MMHG | HEIGHT: 67 IN | SYSTOLIC BLOOD PRESSURE: 101 MMHG | TEMPERATURE: 97.7 F | BODY MASS INDEX: 41.64 KG/M2 | WEIGHT: 265.3 LBS | HEART RATE: 93 BPM | OXYGEN SATURATION: 98 %

## 2025-02-06 DIAGNOSIS — O09.219 HISTORY OF PRECIPITOUS LABOR AND DELIVERIES, ANTEPARTUM: Primary | ICD-10-CM

## 2025-02-06 DIAGNOSIS — O09.522 MULTIGRAVIDA OF ADVANCED MATERNAL AGE IN SECOND TRIMESTER: ICD-10-CM

## 2025-02-06 DIAGNOSIS — O44.42 LOW LYING PLACENTA NOS OR WITHOUT HEMORRHAGE, SECOND TRIMESTER: ICD-10-CM

## 2025-02-06 DIAGNOSIS — O43.192 MARGINAL INSERTION OF UMBILICAL CORD AFFECTING MANAGEMENT OF MOTHER IN SECOND TRIMESTER: ICD-10-CM

## 2025-02-06 LAB
GLUCOSE URINE, POC: NEGATIVE MG/DL
PROTEIN UA: POSITIVE

## 2025-02-06 PROCEDURE — 81002 URINALYSIS NONAUTO W/O SCOPE: CPT | Performed by: OBSTETRICS & GYNECOLOGY

## 2025-02-06 PROCEDURE — 99213 OFFICE O/P EST LOW 20 MIN: CPT | Performed by: OBSTETRICS & GYNECOLOGY

## 2025-02-06 PROCEDURE — 99999 PR OFFICE/OUTPT VISIT,PROCEDURE ONLY: CPT | Performed by: OBSTETRICS & GYNECOLOGY

## 2025-02-06 PROCEDURE — 76821 MIDDLE CEREBRAL ARTERY ECHO: CPT | Performed by: OBSTETRICS & GYNECOLOGY

## 2025-02-06 PROCEDURE — 76815 OB US LIMITED FETUS(S): CPT | Performed by: OBSTETRICS & GYNECOLOGY

## 2025-02-06 PROCEDURE — 76820 UMBILICAL ARTERY ECHO: CPT | Performed by: OBSTETRICS & GYNECOLOGY

## 2025-02-06 PROCEDURE — 76818 FETAL BIOPHYS PROFILE W/NST: CPT | Performed by: OBSTETRICS & GYNECOLOGY

## 2025-02-06 NOTE — PATIENT INSTRUCTIONS
Please arrive for your scheduled appointment at least 15 minutes early with your actual insurance card+ a photo ID. Also if you need any refills ordered or have questions, it may take up 48 hours to reply. Please allow ample time for your refills. Call me when you use last refill. Thank you for your cooperation. You might be having an NST at your next appt. Please eat a large snack or breakfast before coming to office. Thank youCall your primary obstetrician with bleeding, leaking of fluid, abdominal tenderness, headache, blurry vision, epigastric pain and increased urinary frequency.If you are experiencing an emergency and need immediate help, call 911 or go to go emergency room or labor and delivery. Do kick counts after dinner. Call your primary obstetrician if less than 10 kicks in 2 hours after dinner.     Call your primary obstetrician with bleeding, leaking of fluid, abdominal tenderness, headache, blurry vision, epigastric pain and increased urinary frequency.if you are sick, not feeling well or have an infectious process going on please reschedule your appointment by calling [phone]. Also if any family members are not feeling well, please do not bring them to your appointment. We appreciate your cooperation. We are doing this in order to protect our pregnant mothers+ their babies.if you are sick, not feeling well or have an infectious process going on please reschedule your appointment by calling [phone]. Also if any family members are not feeling well, please do not bring them to your appointment. We appreciate your cooperation. We are doing this in order to protect our pregnant mothers+ their babies.

## 2025-02-06 NOTE — PROGRESS NOTES
Raghu ELLISON here for follow up ultrasound and nst.  She denies bleeding and cramping. Pt had some LOF last night. She states she hasn't had any since.  Positive fetal movement.

## 2025-02-12 NOTE — PROGRESS NOTES
2025      Daniel Brownlee MD  [address]  [address]  [place] [address]     RE:  SUHAS ELLISON  : 1985   AGE: 39 y.o.    This report has been created using voice recognition software. It may contain errors which are inherent in voice recognition technology.    Dear Dr. Brownlee:      As you know, Ms. ELLISON  is a 39 y.o.  at 33w1d (11 WK US) who is being followed by our office for multiple medical issues.      The patient had a fetal ultrasound that was notable for the following.  There is a single intrauterine gestation in a cephalic presentation with a heart rate of 170 beats per minute.  The placenta is posterior.  The amniotic fluid index is 11.8 cm.  BPP 10/10.  Doppler studies normal.  CPR PI normal.      PERTINENT PHYSICAL EXAMINATION:   /73 (Position: Sitting)   Pulse (!) 114   Temp 97.4 °F (36.3 °C)   Ht 1.702 m (5' 7\")   Wt 118 kg (260 lb 1.6 oz)   LMP 06/15/2024 (Approximate)   SpO2 98%   BMI 40.74 kg/m²     Urine dipstick:   Negative for Glucose    Trace for Albumin      A fetal ultrasound assessment was performed today. A report is enclosed for your review.    Assessment & Plan:  39 y.o.  at 33w1d (11 WK US) with:    1.  Fetal testing -- The patient presented to the office today for fetal testing secondary to advanced maternal age, a history of 3 prior precipitous deliveries/home deliveries, proteinuria, BMI 41, subchronic hemorrhage, a marginal cord insertion low vitamin D, and a lagging head circumference.  The patient did not have any concerns today per nursing.  Fetal testing was reassuring and the biophysical profile was 10/10 and Doppler studies were normal.    The patient should continue to have twice-weekly fetal testing until delivery.    Her cervical length should be monitored in the third trimester secondary to her history of precipitous deliveries.    Delivery is recommended at/by 39 weeks gestation.  Delivery may be indicated sooner should

## 2025-02-13 NOTE — PROGRESS NOTES
2025      Daniel Brownlee MD  [address]  [address]  [place] [address]     RE:  SUHAS ELLISON  : 1985   AGE: 39 y.o.    This report has been created using voice recognition software. It may contain errors which are inherent in voice recognition technology.    Dear Dr. Brownlee:      As you know, Ms. ELLISON  is a 39 y.o.  at 35w4d (11 WK US) who is being followed by our office for a history of 3 prior precipitous deliveries/home deliveries, BMI 41, a marginal umbilical cord insertion, and advanced maternal age.      The patient had a fetal ultrasound that was notable for the following.  There is a single intrauterine gestation in a cephalic presentation with a heart rate of 157 beats per minute.  The placenta is posterior.  The amniotic fluid index is 12 cm.  BPP 10/10.  Doppler studies normal.  CPR PI >1.        PERTINENT PHYSICAL EXAMINATION:   /61 (Position: Sitting)   Pulse 93   Temp 97.7 °F (36.5 °C)   Ht 1.702 m (5' 7\")   Wt 120.3 kg (265 lb 4.8 oz)   LMP 06/15/2024 (Approximate)   SpO2 98%   BMI 41.55 kg/m²     Urine dipstick:   Negative for Glucose    Trace for Albumin      A fetal ultrasound assessment was performed today. A report is enclosed for your review.    Assessment & Plan:  39 y.o.  at 35w4d (11 WK US) with:    1.  Fetal testing -- The patient presented to the office today for fetal testing secondary to the issues outlined above.  The patient did not have any concerns today per nursing.  Fetal testing was reassuring with a biophysical profile score of 10/10 and normal Doppler studies.    The patient to continue to have twice-weekly testing until delivery.        --I requested the patient return for a follow-up assessment in 4 days unless there is a clinical reason for her to return prior to that time. She is to call if she has any problems or questions prior to her next visit. Further evaluation and management will be dependent on her clinical

## 2025-02-20 ENCOUNTER — ROUTINE PRENATAL (OUTPATIENT)
Dept: OBGYN CLINIC | Age: 40
End: 2025-02-20
Payer: MEDICAID

## 2025-02-20 ENCOUNTER — ANCILLARY PROCEDURE (OUTPATIENT)
Dept: OBGYN CLINIC | Age: 40
End: 2025-02-20
Payer: MEDICAID

## 2025-02-20 VITALS
DIASTOLIC BLOOD PRESSURE: 79 MMHG | SYSTOLIC BLOOD PRESSURE: 119 MMHG | HEART RATE: 114 BPM | BODY MASS INDEX: 40.23 KG/M2 | TEMPERATURE: 97.6 F | WEIGHT: 256.3 LBS | HEIGHT: 67 IN

## 2025-02-20 DIAGNOSIS — O44.42 LOW LYING PLACENTA NOS OR WITHOUT HEMORRHAGE, SECOND TRIMESTER: ICD-10-CM

## 2025-02-20 DIAGNOSIS — O09.522 MULTIGRAVIDA OF ADVANCED MATERNAL AGE IN SECOND TRIMESTER: ICD-10-CM

## 2025-02-20 DIAGNOSIS — O09.219 HISTORY OF PRECIPITOUS LABOR AND DELIVERIES, ANTEPARTUM: Primary | ICD-10-CM

## 2025-02-20 LAB
GLUCOSE URINE, POC: NEGATIVE MG/DL
PROTEIN UA: POSITIVE

## 2025-02-20 PROCEDURE — 81002 URINALYSIS NONAUTO W/O SCOPE: CPT | Performed by: OBSTETRICS & GYNECOLOGY

## 2025-02-20 PROCEDURE — 76821 MIDDLE CEREBRAL ARTERY ECHO: CPT | Performed by: OBSTETRICS & GYNECOLOGY

## 2025-02-20 PROCEDURE — 76818 FETAL BIOPHYS PROFILE W/NST: CPT | Performed by: OBSTETRICS & GYNECOLOGY

## 2025-02-20 PROCEDURE — 76820 UMBILICAL ARTERY ECHO: CPT | Performed by: OBSTETRICS & GYNECOLOGY

## 2025-02-20 PROCEDURE — 99203 OFFICE O/P NEW LOW 30 MIN: CPT | Performed by: OBSTETRICS & GYNECOLOGY

## 2025-02-20 PROCEDURE — 76816 OB US FOLLOW-UP PER FETUS: CPT | Performed by: OBSTETRICS & GYNECOLOGY

## 2025-02-20 PROCEDURE — 99999 PR OFFICE/OUTPT VISIT,PROCEDURE ONLY: CPT | Performed by: OBSTETRICS & GYNECOLOGY

## 2025-02-20 NOTE — PROGRESS NOTES
Raghu ELLISON here for follow up ultrasound today.   She denies bleeding and lof. Pt is having cramping. Pt would like refill on aspirin sent to pharmacy.  Positive fetal movement.

## 2025-02-20 NOTE — PATIENT INSTRUCTIONS
Please arrive for your scheduled appointment at least 15 minutes early with your actual insurance card+ a photo ID. Also if you need any refills ordered or have questions, it may take up 48 hours to reply. Please allow ample time for your refills. Call me when you use last refill. Thank you for your cooperation. You might be having an NST at your next appt. Please eat a large snack or breakfast before coming to office. Thank youCall your primary obstetrician with bleeding, leaking of fluid, abdominal tenderness, headache, blurry vision, epigastric pain and increased urinary frequency.If you are experiencing an emergency and need immediate help, call 911 or go to go emergency room or labor and delivery. Do kick counts after dinner. Call your primary obstetrician if less than 10 kicks in 2 hours after dinner.     Call your primary obstetrician with bleeding, leaking of fluid, abdominal tenderness, headache, blurry vision, epigastric pain and increased urinary frequency.if you are sick, not feeling well or have an infectious process going on please reschedule your appointment by calling [phone]. Also if any family members are not feeling well, please do not bring them to your appointment. We appreciate your cooperation. We are doing this in order to protect our pregnant mothers+ their babies.if you are sick, not feeling well or have an infectious process going on please reschedule your appointment by calling [phone]. Also if any family members are not feeling well, please do not bring them to your appointment. We appreciate your cooperation. We are doing this in order to protect our pregnant mothers+ their babies.

## 2025-02-21 NOTE — PROGRESS NOTES
2025      Daniel Brownlee MD  [address]  [address]  [place] [address]     RE:  SUHAS ELLISON  : 1985   AGE: 39 y.o.    This report has been created using voice recognition software. It may contain errors which are inherent in voice recognition technology.    Dear Dr. Brownlee:      As you know, Ms. ELLISON  is a 39 y.o.  at 37w4d (11 WK US) who is being followed by our office for multiple medical issues including advanced maternal age, a history of 3 prior precipitous vaginal deliveries, BMI 40, a marginal umbilical cord insertion, low vitamin D, and a lagging fetal head circumference that has improved.      The patient had a fetal ultrasound that was notable for the following.  There is a single intrauterine gestation in a cephalic presentation with a heart rate of 154 beats per minute.  The placenta is posterior.  The amniotic fluid index is 14.6 cm.  The composite gestational age is 36w1d.  The estimated fetal weight is at the 39th percentile.  HC 24th percentile, AC 13 percentile.  BPP 10/10.  Umbilical artery PI normal.  MCA Dopplers normal.  CPR PI normal.      PERTINENT PHYSICAL EXAMINATION:   /79 (Position: Sitting)   Pulse (!) 114   Temp 97.6 °F (36.4 °C)   Ht 1.702 m (5' 7\")   Wt 116.3 kg (256 lb 4.8 oz)   LMP 06/15/2024 (Approximate)   BMI 40.14 kg/m²     Urine dipstick:   Negative for Glucose    Trace for Albumin      A fetal ultrasound assessment was performed today. A report is enclosed for your review.    Assessment & Plan:  39 y.o.  at 37w4d (11 WK US) with:    1.  Fetal testing -- The patient presented to the office today for fetal testing secondary to the issues outlined above.  The patient did not have any concerns today per nursing.  Fetal growth was appropriate for the gestational age.  Fetal testing was reassuring.    The patient should continue to have twice-weekly fetal testing until delivery.    Delivery is recommended at/by 39 weeks

## 2025-03-06 ENCOUNTER — ANESTHESIA (OUTPATIENT)
Dept: LABOR AND DELIVERY | Age: 40
End: 2025-03-06
Payer: MEDICAID

## 2025-03-06 ENCOUNTER — HOSPITAL ENCOUNTER (INPATIENT)
Age: 40
LOS: 2 days | Discharge: HOME | End: 2025-03-08
Attending: FAMILY MEDICINE | Admitting: FAMILY MEDICINE
Payer: MEDICAID

## 2025-03-06 ENCOUNTER — ANESTHESIA EVENT (OUTPATIENT)
Dept: LABOR AND DELIVERY | Age: 40
End: 2025-03-06
Payer: MEDICAID

## 2025-03-06 PROBLEM — Z3A.38 38 WEEKS GESTATION OF PREGNANCY: Status: ACTIVE | Noted: 2025-03-06

## 2025-03-06 LAB
ABO + RH BLD: NORMAL
AMPHET UR QL SCN: NEGATIVE
ARM BAND NUMBER: NORMAL
BARBITURATES UR QL SCN: NEGATIVE
BENZODIAZ UR QL: NEGATIVE
BLOOD BANK SAMPLE EXPIRATION: NORMAL
BLOOD GROUP ANTIBODIES SERPL: NEGATIVE
BUPRENORPHINE UR QL: NEGATIVE
CANNABINOIDS UR QL SCN: NEGATIVE
COCAINE UR QL SCN: NEGATIVE
ERYTHROCYTE [DISTWIDTH] IN BLOOD BY AUTOMATED COUNT: 13.2 % (ref 11.5–15)
FENTANYL UR QL: NEGATIVE
HCT VFR BLD AUTO: 45.3 % (ref 34–48)
HGB BLD-MCNC: 15.2 G/DL (ref 11.5–15.5)
MCH RBC QN AUTO: 31.5 PG (ref 26–35)
MCHC RBC AUTO-ENTMCNC: 33.6 G/DL (ref 32–34.5)
MCV RBC AUTO: 93.8 FL (ref 80–99.9)
METHADONE UR QL: NEGATIVE
OPIATES UR QL SCN: NEGATIVE
OXYCODONE UR QL SCN: NEGATIVE
PCP UR QL SCN: NEGATIVE
PLATELET # BLD AUTO: 232 K/UL (ref 130–450)
PMV BLD AUTO: 10.6 FL (ref 7–12)
RBC # BLD AUTO: 4.83 M/UL (ref 3.5–5.5)
TEST INFORMATION: NORMAL
WBC OTHER # BLD: 6.7 K/UL (ref 4.5–11.5)

## 2025-03-06 PROCEDURE — 51701 INSERT BLADDER CATHETER: CPT

## 2025-03-06 PROCEDURE — 86850 RBC ANTIBODY SCREEN: CPT

## 2025-03-06 PROCEDURE — 10H07YZ INSERTION OF OTHER DEVICE INTO PRODUCTS OF CONCEPTION, VIA NATURAL OR ARTIFICIAL OPENING: ICD-10-PCS | Performed by: MIDWIFE

## 2025-03-06 PROCEDURE — 10907ZC DRAINAGE OF AMNIOTIC FLUID, THERAPEUTIC FROM PRODUCTS OF CONCEPTION, VIA NATURAL OR ARTIFICIAL OPENING: ICD-10-PCS | Performed by: MIDWIFE

## 2025-03-06 PROCEDURE — 1220000001 HC SEMI PRIVATE L&D R&B

## 2025-03-06 PROCEDURE — 86900 BLOOD TYPING SEROLOGIC ABO: CPT

## 2025-03-06 PROCEDURE — 86901 BLOOD TYPING SEROLOGIC RH(D): CPT

## 2025-03-06 PROCEDURE — 85027 COMPLETE CBC AUTOMATED: CPT

## 2025-03-06 PROCEDURE — 10H073Z INSERTION OF MONITORING ELECTRODE INTO PRODUCTS OF CONCEPTION, VIA NATURAL OR ARTIFICIAL OPENING: ICD-10-PCS | Performed by: MIDWIFE

## 2025-03-06 PROCEDURE — 80307 DRUG TEST PRSMV CHEM ANLYZR: CPT

## 2025-03-06 PROCEDURE — 7200000001 HC VAGINAL DELIVERY

## 2025-03-06 PROCEDURE — 2500000003 HC RX 250 WO HCPCS

## 2025-03-06 PROCEDURE — 6370000000 HC RX 637 (ALT 250 FOR IP)

## 2025-03-06 PROCEDURE — 3700000025 EPIDURAL BLOCK: Performed by: STUDENT IN AN ORGANIZED HEALTH CARE EDUCATION/TRAINING PROGRAM

## 2025-03-06 RX ORDER — EPHEDRINE SULFATE/0.9% NACL/PF 25 MG/5 ML
10 SYRINGE (ML) INTRAVENOUS
Status: DISPENSED | OUTPATIENT
Start: 2025-03-06 | End: 2025-03-07

## 2025-03-06 RX ORDER — SODIUM CHLORIDE 0.9 % (FLUSH) 0.9 %
5-40 SYRINGE (ML) INJECTION EVERY 12 HOURS SCHEDULED
Status: DISCONTINUED | OUTPATIENT
Start: 2025-03-07 | End: 2025-03-08 | Stop reason: HOSPADM

## 2025-03-06 RX ORDER — PENICILLIN G POTASSIUM 5000000 [IU]/1
INJECTION, POWDER, FOR SOLUTION INTRAMUSCULAR; INTRAVENOUS
Status: DISCONTINUED
Start: 2025-03-06 | End: 2025-03-06 | Stop reason: WASHOUT

## 2025-03-06 RX ORDER — ONDANSETRON 2 MG/ML
4 INJECTION INTRAMUSCULAR; INTRAVENOUS EVERY 6 HOURS PRN
Status: DISCONTINUED | OUTPATIENT
Start: 2025-03-06 | End: 2025-03-08 | Stop reason: HOSPADM

## 2025-03-06 RX ORDER — MISOPROSTOL 200 UG/1
400 TABLET ORAL PRN
Status: DISCONTINUED | OUTPATIENT
Start: 2025-03-06 | End: 2025-03-08 | Stop reason: HOSPADM

## 2025-03-06 RX ORDER — SODIUM CHLORIDE, SODIUM LACTATE, POTASSIUM CHLORIDE, AND CALCIUM CHLORIDE .6; .31; .03; .02 G/100ML; G/100ML; G/100ML; G/100ML
500 INJECTION, SOLUTION INTRAVENOUS PRN
Status: DISCONTINUED | OUTPATIENT
Start: 2025-03-06 | End: 2025-03-08 | Stop reason: HOSPADM

## 2025-03-06 RX ORDER — NALOXONE HYDROCHLORIDE 0.4 MG/ML
INJECTION, SOLUTION INTRAMUSCULAR; INTRAVENOUS; SUBCUTANEOUS PRN
Status: DISCONTINUED | OUTPATIENT
Start: 2025-03-06 | End: 2025-03-08 | Stop reason: HOSPADM

## 2025-03-06 RX ORDER — ONDANSETRON 4 MG/1
4 TABLET, ORALLY DISINTEGRATING ORAL EVERY 6 HOURS PRN
Status: DISCONTINUED | OUTPATIENT
Start: 2025-03-06 | End: 2025-03-08 | Stop reason: HOSPADM

## 2025-03-06 RX ORDER — SODIUM CHLORIDE 0.9 % (FLUSH) 0.9 %
5-40 SYRINGE (ML) INJECTION PRN
Status: DISCONTINUED | OUTPATIENT
Start: 2025-03-06 | End: 2025-03-06 | Stop reason: SDUPTHER

## 2025-03-06 RX ORDER — FERROUS SULFATE 325(65) MG
325 TABLET ORAL EVERY OTHER DAY
Status: DISCONTINUED | OUTPATIENT
Start: 2025-03-07 | End: 2025-03-08 | Stop reason: HOSPADM

## 2025-03-06 RX ORDER — ACETAMINOPHEN 500 MG
1000 TABLET ORAL EVERY 8 HOURS PRN
Status: DISCONTINUED | OUTPATIENT
Start: 2025-03-06 | End: 2025-03-08 | Stop reason: HOSPADM

## 2025-03-06 RX ORDER — SODIUM CHLORIDE 9 MG/ML
25 INJECTION, SOLUTION INTRAVENOUS PRN
Status: DISCONTINUED | OUTPATIENT
Start: 2025-03-06 | End: 2025-03-06 | Stop reason: SDUPTHER

## 2025-03-06 RX ORDER — DOCUSATE SODIUM 100 MG/1
100 CAPSULE, LIQUID FILLED ORAL 2 TIMES DAILY
Status: DISCONTINUED | OUTPATIENT
Start: 2025-03-06 | End: 2025-03-06 | Stop reason: SDUPTHER

## 2025-03-06 RX ORDER — ACETAMINOPHEN 650 MG
TABLET, EXTENDED RELEASE ORAL
Status: DISPENSED
Start: 2025-03-06 | End: 2025-03-07

## 2025-03-06 RX ORDER — LIDOCAINE HYDROCHLORIDE 10 MG/ML
INJECTION, SOLUTION INFILTRATION; PERINEURAL
Status: DISPENSED
Start: 2025-03-06 | End: 2025-03-07

## 2025-03-06 RX ORDER — DEXTROSE MONOHYDRATE 50 MG/ML
INJECTION, SOLUTION INTRAVENOUS
Status: DISCONTINUED
Start: 2025-03-06 | End: 2025-03-06 | Stop reason: WASHOUT

## 2025-03-06 RX ORDER — KETOROLAC TROMETHAMINE 30 MG/ML
30 INJECTION, SOLUTION INTRAMUSCULAR; INTRAVENOUS EVERY 6 HOURS PRN
Status: DISCONTINUED | OUTPATIENT
Start: 2025-03-06 | End: 2025-03-08 | Stop reason: HOSPADM

## 2025-03-06 RX ORDER — ONDANSETRON 4 MG/1
4 TABLET, ORALLY DISINTEGRATING ORAL EVERY 6 HOURS PRN
Status: DISCONTINUED | OUTPATIENT
Start: 2025-03-06 | End: 2025-03-06

## 2025-03-06 RX ORDER — SODIUM CHLORIDE, SODIUM LACTATE, POTASSIUM CHLORIDE, CALCIUM CHLORIDE 600; 310; 30; 20 MG/100ML; MG/100ML; MG/100ML; MG/100ML
INJECTION, SOLUTION INTRAVENOUS CONTINUOUS
Status: DISCONTINUED | OUTPATIENT
Start: 2025-03-07 | End: 2025-03-08 | Stop reason: HOSPADM

## 2025-03-06 RX ORDER — SODIUM CHLORIDE 0.9 % (FLUSH) 0.9 %
5-40 SYRINGE (ML) INJECTION EVERY 12 HOURS SCHEDULED
Status: DISCONTINUED | OUTPATIENT
Start: 2025-03-06 | End: 2025-03-06 | Stop reason: SDUPTHER

## 2025-03-06 RX ORDER — DOCUSATE SODIUM 100 MG/1
100 CAPSULE, LIQUID FILLED ORAL 2 TIMES DAILY
Status: DISCONTINUED | OUTPATIENT
Start: 2025-03-07 | End: 2025-03-08 | Stop reason: HOSPADM

## 2025-03-06 RX ORDER — IBUPROFEN 800 MG/1
800 TABLET, FILM COATED ORAL EVERY 8 HOURS PRN
Status: DISCONTINUED | OUTPATIENT
Start: 2025-03-06 | End: 2025-03-08 | Stop reason: HOSPADM

## 2025-03-06 RX ORDER — METHYLERGONOVINE MALEATE 0.2 MG/ML
200 INJECTION INTRAVENOUS PRN
Status: DISCONTINUED | OUTPATIENT
Start: 2025-03-06 | End: 2025-03-08 | Stop reason: HOSPADM

## 2025-03-06 RX ORDER — TRANEXAMIC ACID 10 MG/ML
1000 INJECTION, SOLUTION INTRAVENOUS
Status: ACTIVE | OUTPATIENT
Start: 2025-03-06 | End: 2025-03-07

## 2025-03-06 RX ORDER — SODIUM CHLORIDE 9 MG/ML
INJECTION, SOLUTION INTRAVENOUS PRN
Status: DISCONTINUED | OUTPATIENT
Start: 2025-03-06 | End: 2025-03-08 | Stop reason: HOSPADM

## 2025-03-06 RX ORDER — TERBUTALINE SULFATE 1 MG/ML
0.25 INJECTION SUBCUTANEOUS
Status: ACTIVE | OUTPATIENT
Start: 2025-03-06 | End: 2025-03-07

## 2025-03-06 RX ORDER — CARBOPROST TROMETHAMINE 250 UG/ML
250 INJECTION, SOLUTION INTRAMUSCULAR PRN
Status: DISCONTINUED | OUTPATIENT
Start: 2025-03-06 | End: 2025-03-08 | Stop reason: HOSPADM

## 2025-03-06 RX ORDER — SODIUM CHLORIDE 0.9 % (FLUSH) 0.9 %
5-40 SYRINGE (ML) INJECTION PRN
Status: DISCONTINUED | OUTPATIENT
Start: 2025-03-06 | End: 2025-03-08 | Stop reason: HOSPADM

## 2025-03-06 RX ORDER — ONDANSETRON 2 MG/ML
4 INJECTION INTRAMUSCULAR; INTRAVENOUS EVERY 6 HOURS PRN
Status: DISCONTINUED | OUTPATIENT
Start: 2025-03-06 | End: 2025-03-06

## 2025-03-06 RX ORDER — SODIUM CHLORIDE, SODIUM LACTATE, POTASSIUM CHLORIDE, CALCIUM CHLORIDE 600; 310; 30; 20 MG/100ML; MG/100ML; MG/100ML; MG/100ML
INJECTION, SOLUTION INTRAVENOUS CONTINUOUS
Status: DISCONTINUED | OUTPATIENT
Start: 2025-03-06 | End: 2025-03-06 | Stop reason: SDUPTHER

## 2025-03-06 RX ORDER — MODIFIED LANOLIN
OINTMENT (GRAM) TOPICAL PRN
Status: DISCONTINUED | OUTPATIENT
Start: 2025-03-06 | End: 2025-03-08 | Stop reason: HOSPADM

## 2025-03-06 RX ORDER — EPHEDRINE SULFATE/0.9% NACL/PF 25 MG/5 ML
5 SYRINGE (ML) INTRAVENOUS PRN
Status: DISCONTINUED | OUTPATIENT
Start: 2025-03-07 | End: 2025-03-08 | Stop reason: HOSPADM

## 2025-03-06 RX ADMIN — Medication 7 ML: at 18:09

## 2025-03-06 RX ADMIN — Medication 15 ML/HR: at 18:11

## 2025-03-06 RX ADMIN — Medication 25 MCG: at 14:08

## 2025-03-06 ASSESSMENT — LIFESTYLE VARIABLES: SMOKING_STATUS: 1

## 2025-03-06 NOTE — ANESTHESIA PRE PROCEDURE
Department of Anesthesiology  Preprocedure Note       Name:  Raghu ELLISON   Age:  39 y.o.  :  1985                                          MRN:  [id number]         Date:  3/6/2025      Surgeon: * No surgeons listed *    Procedure: * No procedures listed *    Medications prior to admission:   Prior to Admission medications    Medication Sig Start Date End Date Taking? Authorizing Provider   Prenatal Vit w/Ov-Xgxiiwinz-QW (PNV PO) Take 1 tablet by mouth daily   Yes ProviderPrudence MD   vitamin D (VITAMIN D3) 50 MCG (2000) CAPS capsule Take 1 capsule by mouth daily 24  Yes Karlene Candelario MD   aspirin (ASPIRIN 81) 81 MG chewable tablet Take 1 tablet by mouth daily 24  Yes Karlene Candelario MD   ondansetron (ZOFRAN) 4 MG tablet Take 1 tablet by mouth every 8 hours as needed for Nausea or Vomiting 24   Karlene Candelario MD   famotidine (PEPCID) 20 MG tablet Take 1 tablet by mouth 2 times daily  Patient not taking: Reported on 2025   Karlene Candelario MD   Prenatal Vit-Fe Fumarate-FA (PRENATAL VITAMINS) 28-0.8 MG TABS Take 1 tablet by mouth daily 24  Annie Nazario PA-C       Current medications:    Current Facility-Administered Medications   Medication Dose Route Frequency Provider Last Rate Last Admin    lactated ringers infusion   IntraVENous Continuous Daniel Brownlee MD        lactated ringers bolus 500 mL  500 mL IntraVENous PRN Daniel Brownlee MD        Or    lactated ringers bolus 500 mL  500 mL IntraVENous PRN Daniel Brownlee MD        sodium chloride flush 0.9 % injection 5-40 mL  5-40 mL IntraVENous 2 times per day Daniel Brownlee MD        sodium chloride flush 0.9 % injection 5-40 mL  5-40 mL IntraVENous PRN Daniel Brownlee MD        0.9 % sodium chloride infusion  25 mL IntraVENous PRN Daniel Brownlee MD        methylergonovine (METHERGINE) injection 200 mcg  200 mcg IntraMUSCular PRN Daniel Brownlee MD        carboprost (HEMABATE) injection 250 mcg  250

## 2025-03-06 NOTE — H&P
Department of Obstetrics and Gynecology  Attending Obstetrics History and Physical      HISTORY OF PRESENT ILLNESS:      The patient is a 39 y.o.  6 parity 4 at 39 weeks 4 days.  Sent from office. Cx 2cm in office. Some ctx's.    Estimated Due Date:  3/9/25  Contractions:  Yes  Leaking of fluid: no  nfm  Blleeding:  No    PRENATAL CARE:    Complications: No      Principal Problem:    38 weeks gestation of pregnancy  Resolved Problems:    * No resolved hospital problems. *        PAST OB HISTORY    OB History    Para Term  AB Living   6 4 4   1 4   SAB IAB Ectopic Molar Multiple Live Births   1         4      # Outcome Date GA Lbr Marcio/2nd Weight Sex Type Anes PTL Lv   6 Current            5 SAB  8w0d    SAB      4 Term 19 37w0d  2.722 kg (6 lb) M Vag-Spont None Y RAMONA      Birth Comments: precipitous delivery at home   3 Term 17 40w0d  3.175 kg (7 lb) F Vag-Spont None N RAMONA      Birth Comments: precipitous delivery at home   2 Term 13 40w0d  3.175 kg (7 lb) F Vag-Spont None N RAMONA      Birth Comments: gave birth in the car on the way to the hospital   1 Term 08 40w0d  3.544 kg (7 lb 13 oz) M Vag-Spont EPI  RAMONA           Pre-eclampsia:  No      :  No      D & C:  No      Cerclage:  No      LEEP:  No      Myomectomy:  No       Labor: No    Past Medical History:    Past Medical History:   Diagnosis Date    Chronic kidney disease     Gonorrhea 2013    treated    Kidney stones     STD (sexually transmitted disease)         Past Surgical History:    Past Surgical History:   Procedure Laterality Date    PERCUTANEOUS NEPHROLITHOTRIPSY          Past Family History:  Family History   Problem Relation Age of Onset    No Known Problems Father     No Known Problems Mother        ROS:  Const: No fever, chills, night sweats, no recent unexplained weight gain/loss  HEENT: No blurred vision, double vision; no ear problems; no sore throat, congestion; no running

## 2025-03-06 NOTE — ANESTHESIA PROCEDURE NOTES
Epidural Block    Patient location during procedure: OB  Reason for block: labor epidural  Staffing  Performed: resident/CRNA   Resident/CRNA: Magalis Kang APRN - CRNA  Performed by: Magalis Kang APRN - CRNA  Authorized by: Anshu Moreno DO    Epidural  Patient position: sitting  Prep: ChloraPrep  Patient monitoring: continuous pulse ox and frequent blood pressure checks  Approach: midline  Location: L3-4  Injection technique: WILL air  Provider prep: mask and sterile gloves  Needle  Needle type: Tuohy   Needle gauge: 18 G  Needle length: 3.5 in  Needle insertion depth: 7 cm  Catheter type: stylet  Catheter size: 20 G  Catheter at skin depth: 12 cm  Test dose: negativeCatheter Secured: tegaderm  Assessment  Hemodynamics: stable  Attempts: 1  Outcomes: uncomplicated  Additional Notes  Pt tolerated well. VSS  Preanesthetic Checklist  Completed: patient identified, IV checked, site marked, risks and benefits discussed, surgical/procedural consents, equipment checked, pre-op evaluation, timeout performed, anesthesia consent given, oxygen available, monitors applied/VS acknowledged, fire risk safety assessment completed and verbalized and blood product R/B/A discussed and consented

## 2025-03-07 LAB
ERYTHROCYTE [DISTWIDTH] IN BLOOD BY AUTOMATED COUNT: 13.1 % (ref 11.5–15)
HCT VFR BLD AUTO: 41 % (ref 34–48)
HGB BLD-MCNC: 13.8 G/DL (ref 11.5–15.5)
MCH RBC QN AUTO: 31.2 PG (ref 26–35)
MCHC RBC AUTO-ENTMCNC: 33.7 G/DL (ref 32–34.5)
MCV RBC AUTO: 92.6 FL (ref 80–99.9)
PLATELET # BLD AUTO: 197 K/UL (ref 130–450)
PMV BLD AUTO: 10.7 FL (ref 7–12)
RBC # BLD AUTO: 4.43 M/UL (ref 3.5–5.5)
WBC OTHER # BLD: 9.8 K/UL (ref 4.5–11.5)

## 2025-03-07 PROCEDURE — 6370000000 HC RX 637 (ALT 250 FOR IP): Performed by: FAMILY MEDICINE

## 2025-03-07 PROCEDURE — 85027 COMPLETE CBC AUTOMATED: CPT

## 2025-03-07 PROCEDURE — 1220000000 HC SEMI PRIVATE OB R&B

## 2025-03-07 RX ORDER — IBUPROFEN 800 MG/1
800 TABLET, FILM COATED ORAL EVERY 8 HOURS PRN
Qty: 60 TABLET | Refills: 0 | Status: SHIPPED | OUTPATIENT
Start: 2025-03-07 | End: 2025-03-27

## 2025-03-07 RX ADMIN — ACETAMINOPHEN 1000 MG: 500 TABLET ORAL at 23:19

## 2025-03-07 RX ADMIN — DOCUSATE SODIUM 100 MG: 100 CAPSULE, LIQUID FILLED ORAL at 21:48

## 2025-03-07 RX ADMIN — IBUPROFEN 800 MG: 800 TABLET, FILM COATED ORAL at 16:57

## 2025-03-07 RX ADMIN — IBUPROFEN 800 MG: 800 TABLET, FILM COATED ORAL at 00:44

## 2025-03-07 RX ADMIN — ACETAMINOPHEN 1000 MG: 500 TABLET ORAL at 16:04

## 2025-03-07 RX ADMIN — IBUPROFEN 800 MG: 800 TABLET, FILM COATED ORAL at 08:43

## 2025-03-07 RX ADMIN — Medication: at 02:00

## 2025-03-07 RX ADMIN — ACETAMINOPHEN 1000 MG: 500 TABLET ORAL at 02:04

## 2025-03-07 RX ADMIN — DOCUSATE SODIUM 100 MG: 100 CAPSULE, LIQUID FILLED ORAL at 08:40

## 2025-03-07 ASSESSMENT — PAIN DESCRIPTION - ORIENTATION
ORIENTATION: LOWER
ORIENTATION: RIGHT;LEFT
ORIENTATION: RIGHT;LEFT
ORIENTATION: LOWER
ORIENTATION: RIGHT;LEFT
ORIENTATION: LOWER

## 2025-03-07 ASSESSMENT — PAIN DESCRIPTION - DESCRIPTORS
DESCRIPTORS: ACHING;CRAMPING
DESCRIPTORS: ACHING;DISCOMFORT;CRAMPING
DESCRIPTORS: CRAMPING
DESCRIPTORS: CRAMPING
DESCRIPTORS: ACHING;DISCOMFORT;CRAMPING
DESCRIPTORS: ACHING;CRAMPING

## 2025-03-07 ASSESSMENT — PAIN SCALES - GENERAL
PAINLEVEL_OUTOF10: 7
PAINLEVEL_OUTOF10: 8
PAINLEVEL_OUTOF10: 6
PAINLEVEL_OUTOF10: 5
PAINLEVEL_OUTOF10: 7
PAINLEVEL_OUTOF10: 7

## 2025-03-07 ASSESSMENT — PAIN - FUNCTIONAL ASSESSMENT
PAIN_FUNCTIONAL_ASSESSMENT: ACTIVITIES ARE NOT PREVENTED

## 2025-03-07 ASSESSMENT — PAIN DESCRIPTION - LOCATION
LOCATION: ABDOMEN

## 2025-03-07 ASSESSMENT — ENCOUNTER SYMPTOMS: SHORTNESS OF BREATH: 0

## 2025-03-07 NOTE — PROGRESS NOTES
Patient admitted into room and oriented to surroundings. Introduced self and wrote this RN's name and phone extension on patient's white board. Phone and nurse's call light at patient's bedside and instructed to use for any needs. Patient instructed on new admission informational packet at bedside with information on infant testing to be done when infant is 24 hours old including ODH labs, 24 hours blood sugar, and CCHD. Patient instructed on mom baby unit policies and procedures including infant safety and fall prevention, of need to keep infant in bassinet for transport in hallways, and for infant to sleep alone, on back, in an empty bassinet. Patient also instructed on unit visitation policy and that one same support person 18 years old or older may stay overnight if desired. Patient verbalized understanding of all of the above. Pt. Refused tdap and flu vaccine.

## 2025-03-07 NOTE — CARE COORDINATION
SW Discharge Planning   SW was verbally asked to see patient due to her other 4 other children staying the night with her and limited support.     SW met with Raghu and introduced self and role. Also present in the room was father of the baby and her 4 other children ranging in ages from 16-5. We did discuss local resources including WIC, [facility], [employer], and what Job and family services could offer. Raghu did report having WIC and declined other resources. Alden Reported that she has all needed items including a car seat and pack and play. We discussed safe sleep practices. Raghu denied any other concerns for housing, lack of food, or other concerns. Patient's two younger children were noted to be jumping on the couch and climbing on top of the couch near the window sill. The children were also jumping from the couch onto the floor. At times children were in the bathroom playing with the water from the sink.  Father of the baby was holding and feeding infant and not attentive to other children. SW did address concerns of children staying the night again tonight again with parents. Mother reporting having no support system, saying \" I am used to this\", however did not make any attempt to redirect or correct Childrens' behaviors.  The 16 year old reported that \" Dad's friend's can watch us\", however father of the baby reported again, that if needed they would \" just leave with the baby\".  Raghu declined to discuss resources further and declined an SW assistance. SW did update nurse manager on the situation. Nursing staff , Mari, was present in the room during some of SW assessment and also witnessed concerns.     PLAN    Attempted to provide resources. Attempted to assist with  plan for children. Mother declined all assistance.     Electronically signed by RANDEE Cm on 3/7/2025 at 10:42 AM

## 2025-03-07 NOTE — L&D DELIVERY NOTE
Department of Obstetrics and Gynecology  Spontaneous Vaginal Delivery Note    Patient:  Raghu ELLISON     Admit Date:  3/6/2025 12:56 PM  Medical Record Number:  [id number]   Procedure Date: 3/6/2025 10:11 PM     Pre-delivery Diagnosis:  Term pregnancy, Induced labor, Single fetus, and Pregnancy complicated by: AMA , a history of 3 prior precipitous vaginal deliveries, BMI 40, a marginal umbilical cord insertion, low vitamin D, and a lagging fetal head circumference     Post-delivery Diagnosis:  Living  infant(s) and Female    Information for the patient's :  Mitchel ELLISON [[id number]]                  Infant Wt:   Information for the patient's :  Mitchel ELLISON [[id number]]         APGARS:     Information for the patient's :  Mitchel ELLISON [[id number]]        Anesthesia:  epidural anesthesia    Application and Delivery:  39 y.o. AA female  at 39w4d admitted for induction who progressed to complete and delivered Cephalic, left occiput anterior presentation via  @ 9:58 PM, under epidural anesthesia,  over an intact perineum, without episiotomy, without a resulting laceration, without dystocia or complication, a Live Born Female infant, weighing 6# 15.5oz, 3.16 kilograms, Clear fluid at delivery, bulb suctioned on perineum. A nuchal cord was not present.  A delayed cord clamping was performed.  Spontaneous cry,  Apgar's 1 minute:  9; 5 minute:  9. The placenta was delivered with gentle traction and was noted to be intact. Episiotomy was not needed.  Repair was not necessary. Cervix, rectum, sphincter intact. Sponge, needle, and instrument counts correct x 2.    Delivery Summary:       Specimen:  Placenta sent to pathology      Estimated blood loss: 200 ml.       Condition:  infant stable to general nursery and mother stable    Blood Type and Rh: B POSITIVE        Rubella Immunity Status:   Immune           Infant Feeding:    both breast and bottle     Attending

## 2025-03-07 NOTE — ANESTHESIA PRE PROCEDURE
Department of Anesthesiology  Preprocedure Note       Name:  Raghu ELLISON   Age:  39 y.o.  :  1985                                          MRN:  [id number]         Date:  3/7/2025      Surgeon: * No surgeons listed *    Procedure: * No procedures listed *    Medications prior to admission:   Prior to Admission medications    Medication Sig Start Date End Date Taking? Authorizing Provider   Prenatal Vit w/Aw-Smerrxakx-BQ (PNV PO) Take 1 tablet by mouth daily   Yes ProviderPrudence MD   vitamin D (VITAMIN D3) 50 MCG (2000) CAPS capsule Take 1 capsule by mouth daily 24  Yes Karlene Candelario MD   aspirin (ASPIRIN 81) 81 MG chewable tablet Take 1 tablet by mouth daily 24  Yes Karlene Candelario MD   ondansetron (ZOFRAN) 4 MG tablet Take 1 tablet by mouth every 8 hours as needed for Nausea or Vomiting 24   Karlene Candelario MD   famotidine (PEPCID) 20 MG tablet Take 1 tablet by mouth 2 times daily  Patient not taking: Reported on 2025   Karlene Candelario MD   Prenatal Vit-Fe Fumarate-FA (PRENATAL VITAMINS) 28-0.8 MG TABS Take 1 tablet by mouth daily 24  Annie Nazario PA-C       Current medications:    Current Facility-Administered Medications   Medication Dose Route Frequency Provider Last Rate Last Admin    lactated ringers bolus 500 mL  500 mL IntraVENous PRN Daniel Brownlee MD        Or    lactated ringers bolus 500 mL  500 mL IntraVENous PRN Daniel Brownlee MD        methylergonovine (METHERGINE) injection 200 mcg  200 mcg IntraMUSCular PRN Daniel Brownlee MD        carboprost (HEMABATE) injection 250 mcg  250 mcg IntraMUSCular PRN Daniel Brownlee MD        miSOPROStol (CYTOTEC) tablet 400 mcg  400 mcg Buccal PRN Daniel Brownlee MD        tranexamic acid-NaCl IVPB premix 1,000 mg  1,000 mg IntraVENous Once PRN Daniel Brownlee MD        oxytocin (PITOCIN) 30 units in 500 mL infusion  87.3 markus-units/min IntraVENous Continuous PRN Daniel Brownlee MD        And

## 2025-03-07 NOTE — LACTATION NOTE
Multiparous mom breast fed her last baby for one year.  I observed this baby during latch on the left breast.  Adjusted positioning to cradle and helped mom hand express drops nipple to mouth and baby latched with ease. Discussed frequency and duration of breastfeeds and signs of adequate milk transfer. Encouraged mom to hand express drops of colostrum at the start of a  breastfeed.  Recommended to avoid a pacifier for three weeks or until breastfeeding is well established.  Mom has an electric breast pump for home.  Went over breastfeeding resources.  Encouraged mom to call us with questions or concerns or for assistance.

## 2025-03-07 NOTE — PROGRESS NOTES
PPD #1    Patient:  Raghu ELLISON     Admit Date:  3/6/2025 12:56 PM  Medical Record Number:  [id number]   Today's Date: 3/7/2025    S: No complaints; tolerating diet: affirms ; ambulating well: affirms; voiding without difficulty:  affirms; bm: denies; flatus: affirms; pain meds appropriate: affirms; vaginal bleeding: thin lochia.    O:   Vitals:    03/06/25 2356 03/07/25 0047 03/07/25 0450 03/07/25 0720   BP: 125/61 104/60 (!) 102/53 108/76   Pulse: 88 78 88 92   Resp:  18 16 16   Temp:  97.8 °F (36.6 °C) 97.7 °F (36.5 °C) 97.7 °F (36.5 °C)   TempSrc:  Oral Oral Oral   SpO2:  99% 97% 99%     Gen: A&O, cooperative, pleasant   Heart: RRR   Lungs: CTA b/l   Abd; soft, NT, non distended, +BS  Back: no CVA or paraspinal tenderness   Ext: No clubbing, cyanosis, edema:no , no cords palpable, no calf tenderness   Neuro: intact   Uterus: firm, well contracted, nt   Perineum: intact, healing well   Fannie pad: staining only    Lab Results   Component Value Date    HGB 13.8 03/07/2025    HCT 41.0 03/07/2025      Recent Results (from the past 72 hour(s))   Drug Screen Multi Urine With Bup    Collection Time: 03/06/25  1:10 PM   Result Value Ref Range    Amphetamine Screen, Ur NEGATIVE NEGATIVE    Barbiturate Screen, Ur NEGATIVE NEGATIVE    Cocaine Metabolite, Urine NEGATIVE NEGATIVE    Benzodiazepine Screen, Urine NEGATIVE NEGATIVE    Buprenorphine Urine NEGATIVE NEGATIVE    Methadone Screen, Urine NEGATIVE NEGATIVE    Opiates, Urine NEGATIVE NEGATIVE    Phencyclidine, Urine NEGATIVE NEGATIVE    Cannabinoid Scrn, Ur NEGATIVE NEGATIVE    Fentanyl, Ur NEGATIVE NEGATIVE    Oxycodone Screen, Ur NEGATIVE NEGATIVE    Test Information       These drug screen results are for medical purposes only and should not be considered definitive or confirmed.   CBC    Collection Time: 03/06/25  1:10 PM   Result Value Ref Range    WBC 6.7 4.5 - 11.5 k/uL    RBC 4.83 3.50 - 5.50 m/uL    Hemoglobin 15.2 11.5 - 15.5 g/dL    Hematocrit 45.3 34.0 -

## 2025-03-07 NOTE — PROGRESS NOTES
Called provider for IOL order. Cytotec x1 ordered, no antibiotics for unknown gbs status at this time per provider.

## 2025-03-07 NOTE — PROGRESS NOTES
Department of Obstetrics and Gynecology  Labor and Delivery  Bedside Procedure Note        PROCEDURE PERFORMED:  IUPC and FSE    PRIMARY INDICATION:  Physician orders for internal monitors       H&P STATUS: H&P was reviewed, the patient was examined with the following changes unable to trace ctx pattern, late decelerations     Procedure explained to the patient.      Procedure:   1. Ultrasound:   Presentation:cephalic      Placenta:posterio     Fetal Cardiac Activity:150    Vaginal Exam: 4cm, 80 effaced  -1 station    AROM:  Amniotic Fluid; clear     IUPC: Inserted at 10 o'clock position until the 45 cm mason at the introitus    Scalp Electrode: placed without difficulty     Procedure well tolerated      COMPLICATIONS:  none    Performed by AMANDA Ramírez CNM

## 2025-03-07 NOTE — DISCHARGE SUMMARY
NEGATIVE    Phencyclidine, Urine NEGATIVE NEGATIVE    Cannabinoid Scrn, Ur NEGATIVE NEGATIVE    Fentanyl, Ur NEGATIVE NEGATIVE    Oxycodone Screen, Ur NEGATIVE NEGATIVE    Test Information       These drug screen results are for medical purposes only and should not be considered definitive or confirmed.   CBC    Collection Time: 03/06/25  1:10 PM   Result Value Ref Range    WBC 6.7 4.5 - 11.5 k/uL    RBC 4.83 3.50 - 5.50 m/uL    Hemoglobin 15.2 11.5 - 15.5 g/dL    Hematocrit 45.3 34.0 - 48.0 %    MCV 93.8 80.0 - 99.9 fL    MCH 31.5 26.0 - 35.0 pg    MCHC 33.6 32.0 - 34.5 g/dL    RDW 13.2 11.5 - 15.0 %    Platelets 232 130 - 450 k/uL    MPV 10.6 7.0 - 12.0 fL   TYPE AND SCREEN    Collection Time: 03/06/25  1:10 PM   Result Value Ref Range    Blood Bank Sample Expiration 03/09/2025,2359     Arm Band Number [id number]     ABO/Rh B POSITIVE     Antibody Screen NEGATIVE    CBC    Collection Time: 03/07/25  5:20 AM   Result Value Ref Range    WBC 9.8 4.5 - 11.5 k/uL    RBC 4.43 3.50 - 5.50 m/uL    Hemoglobin 13.8 11.5 - 15.5 g/dL    Hematocrit 41.0 34.0 - 48.0 %    MCV 92.6 80.0 - 99.9 fL    MCH 31.2 26.0 - 35.0 pg    MCHC 33.7 32.0 - 34.5 g/dL    RDW 13.1 11.5 - 15.0 %    Platelets 197 130 - 450 k/uL    MPV 10.7 7.0 - 12.0 fL         Physicians Following Patient During Hospitalization - Reason For Care:  Admitting Physician: Kem  Delivering Physician: Kem  Rounding Physician(s):    PP#1 Kem   PP#2 Kem  Discharging Physician: Kem  Consultant(s): n/a    Discharge Condition:  Level of Function:  Stable  Caregiver Arrangements and Educational Efforts: Written Discharge Instructions were verbally reviewed and given to the Patient.  Special Problems: None    Plans For Continuing Care:  Unreported Test Results and Intended Follow-Up: 6 week(s) time.  Instructions for Physical Activity: No driving for 1 week(s).  No sex or tampon use for 6 weeks. No douching.  No heavy lifting (greater than baby and carrier) for 6 weeks.   Frequent ambulation with stairs - start slowly then increase as tolerated. Return to work in 6 weeks.  Showers are fine - avoid bath tubs, hot tubs, swimming.   Instructions for Diet: Regular diet  Discharge Medications:  Current Discharge Medication List        START taking these medications    Details   ibuprofen (ADVIL;MOTRIN) 800 MG tablet Take 1 tablet by mouth every 8 hours as needed for Pain  Qty: 60 tablet, Refills: 0           CONTINUE these medications which have NOT CHANGED    Details   vitamin D (VITAMIN D3) 50 MCG (2000 UT) CAPS capsule Take 1 capsule by mouth daily  Qty: 30 capsule, Refills: 3    Associated Diagnoses: Low vitamin D level      Prenatal Vit-Fe Fumarate-FA (PRENATAL VITAMINS) 28-0.8 MG TABS Take 1 tablet by mouth daily  Qty: 30 tablet, Refills: 2           STOP taking these medications       Prenatal Vit w/Db-Vusicqvbf-FV (PNV PO) Comments:   Reason for Stopping:         aspirin (ASPIRIN 81) 81 MG chewable tablet Comments:   Reason for Stopping:         ondansetron (ZOFRAN) 4 MG tablet Comments:   Reason for Stopping:         famotidine (PEPCID) 20 MG tablet Comments:   Reason for Stopping:             Follow-Up Visit Plan: In 6 weeks for final postpartum visit.  Disposition: Home.    Time Spent on Discharge:  25 minutes were spent in patient examination, evaluation, counseling as well as medication reconciliation, prescriptions for required medications, discharge plan and follow up.      Daniel Brownlee MD   3/7/2025 1:58 PM

## 2025-03-07 NOTE — ANESTHESIA POSTPROCEDURE EVALUATION
Department of Anesthesiology  Postprocedure Note    Patient: Raghu ELLISON  MRN: [id number]  YOB: 1985  Date of evaluation: 3/7/2025    Procedure Summary       Date: 03/06/25 Room / Location:     Anesthesia Start: 1750 Anesthesia Stop: 2158    Procedure: Labor Analgesia Diagnosis:     Scheduled Providers:  Responsible Provider: Anshu Moreno DO    Anesthesia Type: general, spinal, epidural ASA Status: 3            Anesthesia Type: No value filed.    Quin Phase I:      Quin Phase II:      Anesthesia Post Evaluation    Patient location during evaluation: PACU  Patient participation: complete - patient participated  Level of consciousness: awake  Cardiovascular status: blood pressure returned to baseline  Respiratory status: acceptable  Hydration status: euvolemic  Multimodal analgesia pain management approach  Pain management: adequate        No notable events documented.

## 2025-03-07 NOTE — PROGRESS NOTES
[place] screening results were discussed with parent. Questions answered. Brochure given to parent. Advised to monitor developmental milestones and contact physician for any concerns.   Carla Arndt

## 2025-03-08 VITALS
DIASTOLIC BLOOD PRESSURE: 62 MMHG | HEART RATE: 91 BPM | TEMPERATURE: 97.9 F | SYSTOLIC BLOOD PRESSURE: 114 MMHG | RESPIRATION RATE: 16 BRPM | OXYGEN SATURATION: 98 %

## 2025-03-08 PROCEDURE — 6370000000 HC RX 637 (ALT 250 FOR IP): Performed by: FAMILY MEDICINE

## 2025-03-08 RX ADMIN — DOCUSATE SODIUM 100 MG: 100 CAPSULE, LIQUID FILLED ORAL at 08:02

## 2025-03-08 RX ADMIN — ACETAMINOPHEN 1000 MG: 500 TABLET ORAL at 08:02

## 2025-03-08 RX ADMIN — IBUPROFEN 800 MG: 800 TABLET, FILM COATED ORAL at 03:44

## 2025-03-08 ASSESSMENT — PAIN SCALES - GENERAL
PAINLEVEL_OUTOF10: 8
PAINLEVEL_OUTOF10: 7

## 2025-03-08 ASSESSMENT — PAIN DESCRIPTION - LOCATION
LOCATION: ABDOMEN
LOCATION: ABDOMEN

## 2025-03-08 ASSESSMENT — PAIN - FUNCTIONAL ASSESSMENT
PAIN_FUNCTIONAL_ASSESSMENT: ACTIVITIES ARE NOT PREVENTED
PAIN_FUNCTIONAL_ASSESSMENT: ACTIVITIES ARE NOT PREVENTED

## 2025-03-08 ASSESSMENT — PAIN DESCRIPTION - ORIENTATION
ORIENTATION: LOWER
ORIENTATION: RIGHT;LEFT

## 2025-03-08 ASSESSMENT — PAIN DESCRIPTION - DESCRIPTORS
DESCRIPTORS: CRAMPING
DESCRIPTORS: DISCOMFORT;CRAMPING

## 2025-03-08 NOTE — DISCHARGE INSTRUCTIONS
Follow up with your OB doctor in 2 week for a  delivery or in 6 weeks for a vaginal delivery unless otherwise instructed, call the office for appointment..  For breastfeeding support, you can contact our lactation specialists at [phone] or   [phone].                                                                  DIET  Eat a well balanced diet focusing on foods high in fiber and protein  Drink plenty of fluids especially water.  To avoid constipation you may take a mild stool softener as recommended by your doctor or midwife.    ACTIVITY  Gradually increase your activity.  Resume exercise regimen only after advised by your doctor or midwife.  Avoid lifting anything heavier than your baby or a gallon of milk until OK'd by your physician or midlife.   Avoid driving until your doctor or midwife has given their approval.  Rise slowly from a lying to sitting and then a standing position.  Climb stairs one at a time.  Use caution when carrying your baby up and down the stairs.  No sexual activity for 6 weeks or until advised by your doctor - Nothing in vagina: intercourse, tampons, or douching.   Be prepared to discuss family planning at your follow-up OB visit.   You may feel tired or have a lack of energy.  You may continue your prenatal vitamin to replenish nutrients post delivery.  Nap when infant naps to catch up on sleep.  May return to work or school in 6 weeks or as directed by physician or midlife.   Take pain medication as prescribed whenever you need them  Take care of yourself by sleeping/resting as much as possible.  Let someone else care for you, your infant and housework as much as possible for a while.    EMOTIONS  You may feed mcgee, sad, teary, & overwhelmed.    If infant will not stop crying, contact another adult for help or place infant in their crib on their back and take a break.  NEVER shake your infant.    Call your doctor if you have unrelieved feelings of: inability to cope,  touch.  You are passing blood clots bigger than the size of a lemon.  If you are experiencing extreme weakness or dizziness.   If you are having flu-like symptoms such as achy muscles or joints.  If you have pain that cannot be relieved.  You have persistent burning with urination or frequency.   You have swelling, bleeding, drainage, foul odor, redness, or warmth in/around your incision or stitches.  You have a red, warm, tender area in you calf.  Call if you have concerns about your well-being or if you feel you may be showing signs of post partum depression, or have thoughts of harming yourself or infant.  Increased pain at the site of the episiotomy.  Difficulty urinating.  Difficulty breathing with or without chest pain.  Headache not relieved by pain meds.   If you are saturating more than one maxi pad in an hour, foul smelling vaginal discharge, sudden continuing increased vaginal bleeding with or without clots.  If you develop a warm, red, tender area on your breast, have nipple discharge which is foul smelling or contains pus, or develop a fever.                               NEVER SHAKE A BABY PROMISE.  Shaking can kill a baby.  It can also cause seizures, brain damage, learning problems,  Cerebral palsy, blindness and other serious health and developmental problems.                                                        I PROMISE NEVER TO SHAKE MY BABY    I understand that caregivers other than the mother often shakes babies. I also promise to discuss the dangers of shaking a baby with everyone who takes care of my baby.  I promise to tell anyone who care for my baby to never, never shake my baby.